# Patient Record
Sex: FEMALE | Race: OTHER | HISPANIC OR LATINO
[De-identification: names, ages, dates, MRNs, and addresses within clinical notes are randomized per-mention and may not be internally consistent; named-entity substitution may affect disease eponyms.]

---

## 2018-04-03 ENCOUNTER — RESULT REVIEW (OUTPATIENT)
Age: 53
End: 2018-04-03

## 2018-06-30 ENCOUNTER — RESULT REVIEW (OUTPATIENT)
Age: 53
End: 2018-06-30

## 2021-03-06 ENCOUNTER — RESULT REVIEW (OUTPATIENT)
Age: 56
End: 2021-03-06

## 2021-04-12 ENCOUNTER — OUTPATIENT (OUTPATIENT)
Dept: OUTPATIENT SERVICES | Facility: HOSPITAL | Age: 56
LOS: 1 days | Discharge: HOME | End: 2021-04-12
Payer: OTHER MISCELLANEOUS

## 2021-04-12 VITALS
SYSTOLIC BLOOD PRESSURE: 132 MMHG | HEIGHT: 63 IN | RESPIRATION RATE: 17 BRPM | TEMPERATURE: 98 F | WEIGHT: 194.67 LBS | HEART RATE: 79 BPM | DIASTOLIC BLOOD PRESSURE: 76 MMHG | OXYGEN SATURATION: 98 %

## 2021-04-12 DIAGNOSIS — M75.121 COMPLETE ROTATOR CUFF TEAR OR RUPTURE OF RIGHT SHOULDER, NOT SPECIFIED AS TRAUMATIC: ICD-10-CM

## 2021-04-12 DIAGNOSIS — Z98.890 OTHER SPECIFIED POSTPROCEDURAL STATES: Chronic | ICD-10-CM

## 2021-04-12 DIAGNOSIS — Z90.710 ACQUIRED ABSENCE OF BOTH CERVIX AND UTERUS: Chronic | ICD-10-CM

## 2021-04-12 DIAGNOSIS — Z01.818 ENCOUNTER FOR OTHER PREPROCEDURAL EXAMINATION: ICD-10-CM

## 2021-04-12 LAB
A1C WITH ESTIMATED AVERAGE GLUCOSE RESULT: 5.9 % — HIGH (ref 4–5.6)
ANION GAP SERPL CALC-SCNC: 6 MMOL/L — SIGNIFICANT CHANGE UP (ref 5–17)
APTT BLD: 31.4 SEC — SIGNIFICANT CHANGE UP (ref 27.5–35.5)
BLD GP AB SCN SERPL QL: SIGNIFICANT CHANGE UP
BUN SERPL-MCNC: 13 MG/DL — SIGNIFICANT CHANGE UP (ref 7–23)
CALCIUM SERPL-MCNC: 9.8 MG/DL — SIGNIFICANT CHANGE UP (ref 8.5–10.1)
CHLORIDE SERPL-SCNC: 104 MMOL/L — SIGNIFICANT CHANGE UP (ref 96–108)
CO2 SERPL-SCNC: 28 MMOL/L — SIGNIFICANT CHANGE UP (ref 22–31)
CREAT SERPL-MCNC: 0.56 MG/DL — SIGNIFICANT CHANGE UP (ref 0.5–1.3)
ESTIMATED AVERAGE GLUCOSE: 123 MG/DL — HIGH (ref 68–114)
GLUCOSE SERPL-MCNC: 90 MG/DL — SIGNIFICANT CHANGE UP (ref 70–99)
HCT VFR BLD CALC: 42.4 % — SIGNIFICANT CHANGE UP (ref 34.5–45)
HGB BLD-MCNC: 13.8 G/DL — SIGNIFICANT CHANGE UP (ref 11.5–15.5)
INR BLD: 1.04 RATIO — SIGNIFICANT CHANGE UP (ref 0.88–1.16)
MCHC RBC-ENTMCNC: 26.3 PG — LOW (ref 27–34)
MCHC RBC-ENTMCNC: 32.5 GM/DL — SIGNIFICANT CHANGE UP (ref 32–36)
MCV RBC AUTO: 80.8 FL — SIGNIFICANT CHANGE UP (ref 80–100)
MRSA PCR RESULT.: SIGNIFICANT CHANGE UP
NRBC # BLD: 0 /100 WBCS — SIGNIFICANT CHANGE UP (ref 0–0)
PLATELET # BLD AUTO: 298 K/UL — SIGNIFICANT CHANGE UP (ref 150–400)
POTASSIUM SERPL-MCNC: 4.2 MMOL/L — SIGNIFICANT CHANGE UP (ref 3.5–5.3)
POTASSIUM SERPL-SCNC: 4.2 MMOL/L — SIGNIFICANT CHANGE UP (ref 3.5–5.3)
PROTHROM AB SERPL-ACNC: 12 SEC — SIGNIFICANT CHANGE UP (ref 10.6–13.6)
RBC # BLD: 5.25 M/UL — HIGH (ref 3.8–5.2)
RBC # FLD: 14.6 % — HIGH (ref 10.3–14.5)
S AUREUS DNA NOSE QL NAA+PROBE: SIGNIFICANT CHANGE UP
SODIUM SERPL-SCNC: 138 MMOL/L — SIGNIFICANT CHANGE UP (ref 135–145)
WBC # BLD: 8.26 K/UL — SIGNIFICANT CHANGE UP (ref 3.8–10.5)
WBC # FLD AUTO: 8.26 K/UL — SIGNIFICANT CHANGE UP (ref 3.8–10.5)

## 2021-04-12 PROCEDURE — 93010 ELECTROCARDIOGRAM REPORT: CPT

## 2021-04-12 NOTE — H&P PST ADULT - NSICDXPROBLEM_GEN_ALL_CORE_FT
PROBLEM DIAGNOSES  Problem: Complete rotator cuff tear or rupture of right shoulder, not specified as traumatic  Assessment and Plan: Right reverse shoulder arthroplasty, biceps tenodesis  labs - cbc,pt/ptt,bmp,t&s,nose cx,ekg  M/C required  preop 3 day hibiclens instruction reviewed and given .instructed on if  nose cx positive use mupuricin 5 days and checklist given  take routine meds DOS with sips of water. avoid NSAID and OTC supplements. verbalized understanding  information on proper nutrition , increase protein and better food choices provided in packet

## 2021-04-12 NOTE — H&P PST ADULT - MS EXT PE MLT D E PC
Last OV 8/25/17  Last lab 8/24/17  Last refill levothyroxine, HCTZ, rabeprazole- Please verify rabeprazole 20 mg ?    Does not show that Dr. Bowie has ever filled these medications     Refill set up below       
Pt states she needs to have Rabeprazole refilled also.   [facility]   
no pedal edema

## 2021-04-12 NOTE — H&P PST ADULT - ASSESSMENT
55F no pmhx c/o right shoulder pain after trip and fall onto right shoulder while at work on 3- found to have complete rotator cuff tear or rupture of right shoulder here for PST for scheduled Right reverse shoulder arthroplasty biceps tenodesis  CAPRINI SCORE    AGE RELATED RISK FACTORS                                                       MOBILITY RELATED FACTORS  [x ] Age 41-60 years                                            (1 Point)                  [ ] Bed rest                                                        (1 Point)  [ ] Age: 61-74 years                                           (2 Points)                [ ] Plaster cast                                                   (2 Points)  [ ] Age= 75 years                                              (3 Points)                 [ ] Bed bound for more than 72 hours                   (2 Points)    DISEASE RELATED RISK FACTORS                                               GENDER SPECIFIC FACTORS  [ ] Edema in the lower extremities                       (1 Point)                  [ ] Pregnancy                                                     (1 Point)  [ ] Varicose veins                                               (1 Point)                  [ ] Post-partum < 6 weeks                                   (1 Point)             [ x] BMI > 25 Kg/m2                                            (1 Point)                  [ ] Hormonal therapy  or oral contraception            (1 Point)                 [ ] Sepsis (in the previous month)                        (1 Point)                  [ ] History of pregnancy complications  [ ] Pneumonia or serious lung disease                                               [ ] Unexplained or recurrent                       (1 Point)           (in the previous month)                               (1 Point)  [ ] Abnormal pulmonary function test                     (1 Point)                 SURGERY RELATED RISK FACTORS  [ ] Acute myocardial infarction                              (1 Point)                 [ ]  Section                                            (1 Point)  [ ] Congestive heart failure (in the previous month)  (1 Point)                 [ ] Minor surgery                                                 (1 Point)   [ ] Inflammatory bowel disease                             (1 Point)                 [ ] Arthroscopic surgery                                        (2 Points)  [ ] Central venous access                                    (2 Points)                [ ] General surgery lasting more than 45 minutes   (2 Points)       [ ] Stroke (in the previous month)                          (5 Points)               [x ] Elective arthroplasty                                        (5 Points)                                                                                                                                               HEMATOLOGY RELATED FACTORS                                                 TRAUMA RELATED RISK FACTORS  [ ] Prior episodes of VTE                                     (3 Points)                 [ ] Fracture of the hip, pelvis, or leg                       (5 Points)  [ ] Positive family history for VTE                         (3 Points)                 [ ] Acute spinal cord injury (in the previous month)  (5 Points)  [ ] Prothrombin 82384 A                                      (3 Points)                 [ ] Paralysis  (less than 1 month)                          (5 Points)  [ ] Factor V Leiden                                             (3 Points)                 [ ] Multiple Trauma within 1 month                         (5 Points)  [ ] Lupus anticoagulants                                     (3 Points)                                                           [ ] Anticardiolipin antibodies                                (3 Points)                                                       [ ] High homocysteine in the blood                      (3 Points)                                             [ ] Other congenital or acquired thrombophilia       (3 Points)                                                [ ] Heparin induced thrombocytopenia                  (3 Points)                                          Total Score [   7       ]

## 2021-04-12 NOTE — H&P PST ADULT - HISTORY OF PRESENT ILLNESS
55F no pmhx c/o right shoulder pain,,,,,, 55F no pmhx c/o right shoulder pain after trip and fall onto right shoulder while at work on 3- found to have complete rotator cuff tear or rupture of right shoulder here for PST for scheduled Right reverse shoulder arthroplasty biceps tenodesis  This patient denies any fever, cough, sob, flu like symptoms or travel outside of the [place] in the past 30 days

## 2021-04-16 DIAGNOSIS — Z01.818 ENCOUNTER FOR OTHER PREPROCEDURAL EXAMINATION: ICD-10-CM

## 2021-04-16 PROBLEM — Z00.00 ENCOUNTER FOR PREVENTIVE HEALTH EXAMINATION: Status: ACTIVE | Noted: 2021-04-16

## 2021-04-23 ENCOUNTER — APPOINTMENT (OUTPATIENT)
Dept: DISASTER EMERGENCY | Facility: CLINIC | Age: 56
End: 2021-04-23

## 2021-04-23 LAB — SARS-COV-2 N GENE NPH QL NAA+PROBE: NOT DETECTED

## 2021-04-25 ENCOUNTER — TRANSCRIPTION ENCOUNTER (OUTPATIENT)
Age: 56
End: 2021-04-25

## 2021-04-25 RX ORDER — BENZOCAINE AND MENTHOL 5; 1 G/100ML; G/100ML
1 LIQUID ORAL
Refills: 0 | Status: DISCONTINUED | OUTPATIENT
Start: 2021-04-26 | End: 2021-04-27

## 2021-04-25 RX ORDER — ASPIRIN/CALCIUM CARB/MAGNESIUM 324 MG
325 TABLET ORAL DAILY
Refills: 0 | Status: DISCONTINUED | OUTPATIENT
Start: 2021-04-27 | End: 2021-04-27

## 2021-04-25 RX ORDER — PANTOPRAZOLE SODIUM 20 MG/1
40 TABLET, DELAYED RELEASE ORAL
Refills: 0 | Status: DISCONTINUED | OUTPATIENT
Start: 2021-04-26 | End: 2021-04-27

## 2021-04-25 RX ORDER — OXYCODONE HYDROCHLORIDE 5 MG/1
10 TABLET ORAL EVERY 4 HOURS
Refills: 0 | Status: DISCONTINUED | OUTPATIENT
Start: 2021-04-26 | End: 2021-04-27

## 2021-04-25 RX ORDER — HYDROMORPHONE HYDROCHLORIDE 2 MG/ML
0.5 INJECTION INTRAMUSCULAR; INTRAVENOUS; SUBCUTANEOUS EVERY 4 HOURS
Refills: 0 | Status: DISCONTINUED | OUTPATIENT
Start: 2021-04-26 | End: 2021-04-27

## 2021-04-25 RX ORDER — SENNA PLUS 8.6 MG/1
2 TABLET ORAL AT BEDTIME
Refills: 0 | Status: DISCONTINUED | OUTPATIENT
Start: 2021-04-26 | End: 2021-04-27

## 2021-04-25 RX ORDER — KETOROLAC TROMETHAMINE 30 MG/ML
30 SYRINGE (ML) INJECTION EVERY 8 HOURS
Refills: 0 | Status: DISCONTINUED | OUTPATIENT
Start: 2021-04-26 | End: 2021-04-27

## 2021-04-25 RX ORDER — OXYCODONE HYDROCHLORIDE 5 MG/1
5 TABLET ORAL EVERY 4 HOURS
Refills: 0 | Status: DISCONTINUED | OUTPATIENT
Start: 2021-04-26 | End: 2021-04-27

## 2021-04-25 RX ORDER — SODIUM CHLORIDE 9 MG/ML
1000 INJECTION, SOLUTION INTRAVENOUS
Refills: 0 | Status: DISCONTINUED | OUTPATIENT
Start: 2021-04-26 | End: 2021-04-27

## 2021-04-25 RX ORDER — ONDANSETRON 8 MG/1
4 TABLET, FILM COATED ORAL EVERY 6 HOURS
Refills: 0 | Status: DISCONTINUED | OUTPATIENT
Start: 2021-04-26 | End: 2021-04-27

## 2021-04-25 RX ORDER — POLYETHYLENE GLYCOL 3350 17 G/17G
17 POWDER, FOR SOLUTION ORAL AT BEDTIME
Refills: 0 | Status: DISCONTINUED | OUTPATIENT
Start: 2021-04-26 | End: 2021-04-27

## 2021-04-25 RX ORDER — LANOLIN ALCOHOL/MO/W.PET/CERES
3 CREAM (GRAM) TOPICAL AT BEDTIME
Refills: 0 | Status: DISCONTINUED | OUTPATIENT
Start: 2021-04-26 | End: 2021-04-27

## 2021-04-25 RX ORDER — ASCORBIC ACID 60 MG
500 TABLET,CHEWABLE ORAL
Refills: 0 | Status: DISCONTINUED | OUTPATIENT
Start: 2021-04-26 | End: 2021-04-27

## 2021-04-25 RX ORDER — ACETAMINOPHEN 500 MG
975 TABLET ORAL EVERY 8 HOURS
Refills: 0 | Status: DISCONTINUED | OUTPATIENT
Start: 2021-04-26 | End: 2021-04-27

## 2021-04-26 ENCOUNTER — RESULT REVIEW (OUTPATIENT)
Age: 56
End: 2021-04-26

## 2021-04-26 ENCOUNTER — TRANSCRIPTION ENCOUNTER (OUTPATIENT)
Age: 56
End: 2021-04-26

## 2021-04-26 ENCOUNTER — INPATIENT (INPATIENT)
Facility: HOSPITAL | Age: 56
LOS: 0 days | Discharge: HOME | End: 2021-04-27
Attending: ORTHOPAEDIC SURGERY | Admitting: ORTHOPAEDIC SURGERY
Payer: OTHER MISCELLANEOUS

## 2021-04-26 VITALS
SYSTOLIC BLOOD PRESSURE: 168 MMHG | HEIGHT: 63 IN | WEIGHT: 195.11 LBS | DIASTOLIC BLOOD PRESSURE: 84 MMHG | TEMPERATURE: 98 F | OXYGEN SATURATION: 98 % | HEART RATE: 70 BPM | RESPIRATION RATE: 17 BRPM

## 2021-04-26 DIAGNOSIS — Z90.710 ACQUIRED ABSENCE OF BOTH CERVIX AND UTERUS: Chronic | ICD-10-CM

## 2021-04-26 DIAGNOSIS — Z98.890 OTHER SPECIFIED POSTPROCEDURAL STATES: Chronic | ICD-10-CM

## 2021-04-26 PROCEDURE — 88311 DECALCIFY TISSUE: CPT | Mod: 26

## 2021-04-26 PROCEDURE — 88309 TISSUE EXAM BY PATHOLOGIST: CPT | Mod: 26

## 2021-04-26 RX ORDER — GLUCOSAMINE/CHONDROITIN/C/MANG 500-400 MG
3 CAPSULE ORAL
Qty: 0 | Refills: 0 | DISCHARGE

## 2021-04-26 RX ORDER — SODIUM CHLORIDE 9 MG/ML
1000 INJECTION, SOLUTION INTRAVENOUS
Refills: 0 | Status: DISCONTINUED | OUTPATIENT
Start: 2021-04-26 | End: 2021-04-26

## 2021-04-26 RX ORDER — ONDANSETRON 8 MG/1
4 TABLET, FILM COATED ORAL ONCE
Refills: 0 | Status: DISCONTINUED | OUTPATIENT
Start: 2021-04-26 | End: 2021-04-26

## 2021-04-26 RX ORDER — ACETAMINOPHEN 500 MG
2 TABLET ORAL
Qty: 0 | Refills: 0 | DISCHARGE

## 2021-04-26 RX ORDER — SODIUM CHLORIDE 9 MG/ML
3 INJECTION INTRAMUSCULAR; INTRAVENOUS; SUBCUTANEOUS EVERY 8 HOURS
Refills: 0 | Status: DISCONTINUED | OUTPATIENT
Start: 2021-04-26 | End: 2021-04-26

## 2021-04-26 RX ORDER — CEFAZOLIN SODIUM 1 G
2000 VIAL (EA) INJECTION EVERY 8 HOURS
Refills: 0 | Status: COMPLETED | OUTPATIENT
Start: 2021-04-26 | End: 2021-04-27

## 2021-04-26 RX ORDER — HYDROMORPHONE HYDROCHLORIDE 2 MG/ML
1 INJECTION INTRAMUSCULAR; INTRAVENOUS; SUBCUTANEOUS
Refills: 0 | Status: DISCONTINUED | OUTPATIENT
Start: 2021-04-26 | End: 2021-04-26

## 2021-04-26 RX ORDER — HYDROMORPHONE HYDROCHLORIDE 2 MG/ML
0.5 INJECTION INTRAMUSCULAR; INTRAVENOUS; SUBCUTANEOUS
Refills: 0 | Status: DISCONTINUED | OUTPATIENT
Start: 2021-04-26 | End: 2021-04-26

## 2021-04-26 RX ADMIN — SODIUM CHLORIDE 120 MILLILITER(S): 9 INJECTION, SOLUTION INTRAVENOUS at 12:57

## 2021-04-26 RX ADMIN — Medication 3 MILLIGRAM(S): at 21:49

## 2021-04-26 RX ADMIN — POLYETHYLENE GLYCOL 3350 17 GRAM(S): 17 POWDER, FOR SOLUTION ORAL at 21:49

## 2021-04-26 RX ADMIN — Medication 100 MILLIGRAM(S): at 17:50

## 2021-04-26 RX ADMIN — Medication 975 MILLIGRAM(S): at 16:00

## 2021-04-26 RX ADMIN — Medication 30 MILLIGRAM(S): at 15:32

## 2021-04-26 RX ADMIN — Medication 30 MILLIGRAM(S): at 22:20

## 2021-04-26 RX ADMIN — SENNA PLUS 2 TABLET(S): 8.6 TABLET ORAL at 21:49

## 2021-04-26 RX ADMIN — Medication 975 MILLIGRAM(S): at 15:32

## 2021-04-26 RX ADMIN — Medication 30 MILLIGRAM(S): at 21:49

## 2021-04-26 RX ADMIN — Medication 975 MILLIGRAM(S): at 22:19

## 2021-04-26 RX ADMIN — Medication 500 MILLIGRAM(S): at 17:50

## 2021-04-26 RX ADMIN — SODIUM CHLORIDE 100 MILLILITER(S): 9 INJECTION, SOLUTION INTRAVENOUS at 17:51

## 2021-04-26 RX ADMIN — Medication 30 MILLIGRAM(S): at 16:00

## 2021-04-26 RX ADMIN — Medication 975 MILLIGRAM(S): at 21:49

## 2021-04-26 NOTE — PHYSICAL THERAPY INITIAL EVALUATION ADULT - PRECAUTIONS/LIMITATIONS, REHAB EVAL
non wt bearing RUE, no ROM R shoulder joints but ok on elbow, wrist and fingers; RUE to be placed on a sling

## 2021-04-26 NOTE — PHYSICAL THERAPY INITIAL EVALUATION ADULT - DIAGNOSIS, PT EVAL
RUE on a sling with precautions to observe, functional ability: bed mobility, transfers and ambulation generally with supervision to independent , RUE on a sling, does not need any walking device.

## 2021-04-26 NOTE — DISCHARGE NOTE PROVIDER - NSDCMRMEDTOKEN_GEN_ALL_CORE_FT
Glucosamine Chondroitin oral capsule: 3 cap(s) orally once a day  Turmeric 500 mg oral capsule: 1 cap(s) orally once a day  Tylenol 325 mg oral tablet: 2 tab(s) orally every 4 hours, As Needed   ascorbic acid 500 mg oral tablet: 1 tab(s) orally 2 times a day  aspirin 325 mg oral tablet: 1 tab(s) orally once a day MDD:1  Glucosamine Chondroitin oral capsule: 3 cap(s) orally once a day  Multiple Vitamins oral tablet: 1 tab(s) orally once a day  oxyCODONE 5 mg oral tablet: 1- 2 tab(s) orally every 4 hours, As Needed -Pain MDD:10  polyethylene glycol 3350 oral powder for reconstitution: 17 gram(s) orally once a day (at bedtime)  senna oral tablet: 2 tab(s) orally once a day (at bedtime)  Tylenol 325 mg oral tablet: 2 tab(s) orally every 4 hours, As Needed

## 2021-04-26 NOTE — CONSULT NOTE ADULT - SUBJECTIVE AND OBJECTIVE BOX
PACO BROWN is a 55y Female s/p RIGHT REVERSE  SHOULDER ARTHROPLASTY BICEPS STENODESIS    RIGHT REVERSE  SHOULDER ARTHROPLASTY, BICEPS TENODESIS      w/ h/o No pertinent past medical history      denies any chest pain shortness of breath palpitation dizziness lightheadedness nausea vomiting fever or chills    S/P arthroscopic surgery of left knee    S/P total hysterectomy        SH: doesnot smoke or drink at this time    No Known Allergies    acetaminophen   Tablet .. 975 milliGRAM(s) Oral every 8 hours  aluminum hydroxide/magnesium hydroxide/simethicone Suspension 30 milliLiter(s) Oral four times a day PRN  ascorbic acid 500 milliGRAM(s) Oral two times a day  benzocaine 15 mG/menthol 3.6 mG (Sugar-Free) Lozenge 1 Lozenge Oral every 2 hours PRN  ceFAZolin   IVPB 2000 milliGRAM(s) IV Intermittent every 8 hours  HYDROmorphone  Injectable 0.5 milliGRAM(s) IV Push every 4 hours PRN  ketorolac   Injectable 30 milliGRAM(s) IV Push every 8 hours  lactated ringers. 1000 milliLiter(s) IV Continuous <Continuous>  melatonin 3 milliGRAM(s) Oral at bedtime  multivitamin 1 Tablet(s) Oral daily  ondansetron Injectable 4 milliGRAM(s) IV Push every 6 hours PRN  oxyCODONE    IR 5 milliGRAM(s) Oral every 4 hours PRN  oxyCODONE    IR 10 milliGRAM(s) Oral every 4 hours PRN  pantoprazole    Tablet 40 milliGRAM(s) Oral before breakfast  polyethylene glycol 3350 17 Gram(s) Oral at bedtime  senna 2 Tablet(s) Oral at bedtime    T(C): 36.8 (04-26-21 @ 16:57), Max: 36.8 (04-26-21 @ 16:57)  HR: 79 (04-26-21 @ 16:57) (59 - 98)  BP: 126/71 (04-26-21 @ 16:57) (100/61 - 168/84)  RR: 17 (04-26-21 @ 16:57) (16 - 20)  SpO2: 97% (04-26-21 @ 16:57) (94% - 99%)  HEENT unremarkable  neck no JVD or bruit  heart normal S1 S2 RRR no gallops or rubs  chest clear to auscultation  abd sof nontender non distended +bs  ext no calf tenderness    A/P   DVT PX  pain control  bowel regimen   wound care as per ortho  GI PX  antiemetics prn  incentive spirometer

## 2021-04-26 NOTE — PHYSICAL THERAPY INITIAL EVALUATION ADULT - PERTINENT HX OF CURRENT PROBLEM, REHAB EVAL
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

## 2021-04-26 NOTE — DISCHARGE NOTE PROVIDER - NSDCCPCAREPLAN_GEN_ALL_CORE_FT
PRINCIPAL DISCHARGE DIAGNOSIS  Diagnosis: Rotator cuff tear arthropathy, right  Assessment and Plan of Treatment:

## 2021-04-26 NOTE — DISCHARGE NOTE PROVIDER - CARE PROVIDER_API CALL
Justino Shepherd  ORTHOPAEDIC SURGERY  [address] [place] [address]  Phone: [phone]  Fax: [phone]  Follow Up Time:

## 2021-04-26 NOTE — PHYSICAL THERAPY INITIAL EVALUATION ADULT - LIVES WITH, PROFILE
Pt states she lives in a pvt house with grand daughter and daughter, has 5 steps with 1 rails to enter the house, stays on main floor.

## 2021-04-26 NOTE — DISCHARGE NOTE PROVIDER - HOSPITAL COURSE
55yFemale with history of OA presenting for right reverse TSA by Dr. Shepherd on 4/26. Risk and benefits of surgery were explained to the patient. The patient understood and agreed to proceed with surgery. Patient underwent the procedure with no intraoperative complications. Pt was brought in stable condition to the PACU. Once stable in PACU, pt was brought to the floor. During hospital stay pt was followed by Medicine, physical therapy, Home Care during this admission. Pt had an uneventful hospital course. Pt is stable for discharge to home 55yFemale with history of OA presenting for right reverse TSA by Dr. Shepherd on 4/26. Risk and benefits of surgery were explained to the patient. The patient understood and agreed to proceed with surgery. Patient underwent the procedure with no intraoperative complications. Pt was brought in stable condition to the PACU. Once stable in PACU, pt was brought to the floor. During hospital stay pt was followed by Medicine, physical therapy, Home Care during this admission. Pt had an uneventful hospital course. Pt is stable for discharge to home on POD#1.

## 2021-04-26 NOTE — PHYSICAL THERAPY INITIAL EVALUATION ADULT - GENERAL OBSERVATIONS, REHAB EVAL
Pt. is found supine, alert, on room air, c/o no pain in the RUE but whole arm is numb, has sling, RUE properly positioned with pillow support.

## 2021-04-26 NOTE — PHYSICAL THERAPY INITIAL EVALUATION ADULT - RANGE OF MOTION EXAMINATION, REHAB EVAL
R shoulder joint ROM not tested per precaution of NO ROM; R elbow wrist and fingers WNL; LUE and both LE WNL

## 2021-04-26 NOTE — DISCHARGE NOTE PROVIDER - NSDCFUADDAPPT_GEN_ALL_CORE_FT
Follow up with your surgeon in two weeks. Call for appointment.  If you need more pain medication, call your surgeon's office. For medication refills or authorizations, please call [phone] xt [phone]  We recommend that you call and schedule a follow up appointment within 2-4 weeks with your primary care physician for repeat blood work (CBC and BMP) for post hospital discharge follow-up care.  Call your surgeon if you have increased redness/pain/drainage or fever. Return to ER for shortness of breath/calf tenderness.

## 2021-04-27 ENCOUNTER — TRANSCRIPTION ENCOUNTER (OUTPATIENT)
Age: 56
End: 2021-04-27

## 2021-04-27 VITALS
SYSTOLIC BLOOD PRESSURE: 122 MMHG | RESPIRATION RATE: 16 BRPM | HEART RATE: 73 BPM | TEMPERATURE: 98 F | DIASTOLIC BLOOD PRESSURE: 60 MMHG | OXYGEN SATURATION: 96 %

## 2021-04-27 LAB
ANION GAP SERPL CALC-SCNC: 7 MMOL/L — SIGNIFICANT CHANGE UP (ref 5–17)
BUN SERPL-MCNC: 17 MG/DL — SIGNIFICANT CHANGE UP (ref 7–23)
CALCIUM SERPL-MCNC: 8.2 MG/DL — LOW (ref 8.5–10.1)
CHLORIDE SERPL-SCNC: 99 MMOL/L — SIGNIFICANT CHANGE UP (ref 96–108)
CO2 SERPL-SCNC: 25 MMOL/L — SIGNIFICANT CHANGE UP (ref 22–31)
CREAT SERPL-MCNC: 0.66 MG/DL — SIGNIFICANT CHANGE UP (ref 0.5–1.3)
GLUCOSE SERPL-MCNC: 135 MG/DL — HIGH (ref 70–99)
HCT VFR BLD CALC: 31.8 % — LOW (ref 34.5–45)
HGB BLD-MCNC: 10.6 G/DL — LOW (ref 11.5–15.5)
MCHC RBC-ENTMCNC: 26.4 PG — LOW (ref 27–34)
MCHC RBC-ENTMCNC: 33.3 GM/DL — SIGNIFICANT CHANGE UP (ref 32–36)
MCV RBC AUTO: 79.1 FL — LOW (ref 80–100)
NRBC # BLD: 0 /100 WBCS — SIGNIFICANT CHANGE UP (ref 0–0)
PLATELET # BLD AUTO: 195 K/UL — SIGNIFICANT CHANGE UP (ref 150–400)
POTASSIUM SERPL-MCNC: 3.9 MMOL/L — SIGNIFICANT CHANGE UP (ref 3.5–5.3)
POTASSIUM SERPL-SCNC: 3.9 MMOL/L — SIGNIFICANT CHANGE UP (ref 3.5–5.3)
RBC # BLD: 4.02 M/UL — SIGNIFICANT CHANGE UP (ref 3.8–5.2)
RBC # FLD: 14.4 % — SIGNIFICANT CHANGE UP (ref 10.3–14.5)
SODIUM SERPL-SCNC: 131 MMOL/L — LOW (ref 135–145)
WBC # BLD: 12.09 K/UL — HIGH (ref 3.8–10.5)
WBC # FLD AUTO: 12.09 K/UL — HIGH (ref 3.8–10.5)

## 2021-04-27 PROCEDURE — 73030 X-RAY EXAM OF SHOULDER: CPT | Mod: 26,RT

## 2021-04-27 RX ORDER — ASCORBIC ACID 60 MG
1 TABLET,CHEWABLE ORAL
Qty: 0 | Refills: 0 | DISCHARGE
Start: 2021-04-27

## 2021-04-27 RX ORDER — OXYCODONE HYDROCHLORIDE 5 MG/1
2 TABLET ORAL
Qty: 50 | Refills: 0
Start: 2021-04-27 | End: 2021-05-01

## 2021-04-27 RX ORDER — SENNA PLUS 8.6 MG/1
2 TABLET ORAL
Qty: 0 | Refills: 0 | DISCHARGE
Start: 2021-04-27

## 2021-04-27 RX ORDER — MILK THISTLE 150 MG
1 CAPSULE ORAL
Qty: 0 | Refills: 0 | DISCHARGE

## 2021-04-27 RX ORDER — ASPIRIN/CALCIUM CARB/MAGNESIUM 324 MG
1 TABLET ORAL
Qty: 14 | Refills: 0
Start: 2021-04-27 | End: 2021-05-10

## 2021-04-27 RX ORDER — POLYETHYLENE GLYCOL 3350 17 G/17G
17 POWDER, FOR SOLUTION ORAL
Qty: 0 | Refills: 0 | DISCHARGE
Start: 2021-04-27

## 2021-04-27 RX ADMIN — Medication 1 TABLET(S): at 11:37

## 2021-04-27 RX ADMIN — Medication 30 MILLIGRAM(S): at 05:24

## 2021-04-27 RX ADMIN — Medication 100 MILLIGRAM(S): at 03:18

## 2021-04-27 RX ADMIN — Medication 30 MILLIGRAM(S): at 06:32

## 2021-04-27 RX ADMIN — Medication 325 MILLIGRAM(S): at 11:37

## 2021-04-27 RX ADMIN — PANTOPRAZOLE SODIUM 40 MILLIGRAM(S): 20 TABLET, DELAYED RELEASE ORAL at 07:41

## 2021-04-27 RX ADMIN — OXYCODONE HYDROCHLORIDE 10 MILLIGRAM(S): 5 TABLET ORAL at 11:37

## 2021-04-27 RX ADMIN — Medication 975 MILLIGRAM(S): at 06:32

## 2021-04-27 RX ADMIN — Medication 500 MILLIGRAM(S): at 05:24

## 2021-04-27 RX ADMIN — Medication 975 MILLIGRAM(S): at 05:24

## 2021-04-27 RX ADMIN — OXYCODONE HYDROCHLORIDE 10 MILLIGRAM(S): 5 TABLET ORAL at 12:28

## 2021-04-27 NOTE — OCCUPATIONAL THERAPY INITIAL EVALUATION ADULT - RANGE OF MOTION EXAMINATION, UPPER EXTREMITY
RUE shoulder ROM DNT due to precautions; RUE AROM distally to shoulder WFL./Left UE Active ROM was WFL (within functional limits)

## 2021-04-27 NOTE — OCCUPATIONAL THERAPY INITIAL EVALUATION ADULT - ADDITIONAL COMMENTS
Pt lives with son and granddaughter (Son can assist in the morning and after work. Granddaughter goes to school) in a private house with 4-5 steps to enter with bilateral handrails (Close together). Once inside, the pt bedroom and bathroom is on that floor when entering. The pt bathroom has a tub/shower combination, regular toilet and no DME. The pt ambulates with no device and owns a cane at home. The pt is R handed.

## 2021-04-27 NOTE — OCCUPATIONAL THERAPY INITIAL EVALUATION ADULT - STRENGTHENING, PT EVAL
Pt will increase right UE strength to 5/5 to improve functional strength needed to engage in functional tasks by 4 weeks

## 2021-04-27 NOTE — DISCHARGE NOTE NURSING/CASE MANAGEMENT/SOCIAL WORK - PATIENT PORTAL LINK FT
You can access the FollowMyHealth Patient Portal offered by [facility] by registering at the following website: [email or web address]. By joining [employer]’s FollowMyHealth portal, you will also be able to view your health information using other applications (apps) compatible with our system.

## 2021-04-27 NOTE — PROGRESS NOTE ADULT - SUBJECTIVE AND OBJECTIVE BOX
Ortho Post Op Check    Name: PACO BROWN  MR #: [id number]    Procedure: right reverse TSA  Surgeon: Joao    Pt comfortable without complaints, pain controlled. Endorsing numbness to right hand.  Denies CP, SOB, N/V.    General Exam:  Vital Signs Last 24 Hrs  T(C): 36.6 (04-26-21 @ 13:20), Max: 36.7 (04-26-21 @ 08:05)  T(F): 97.8 (04-26-21 @ 13:20), Max: 98 (04-26-21 @ 08:05)  HR: 65 (04-26-21 @ 13:20) (59 - 70)  BP: 111/57 (04-26-21 @ 13:20) (100/61 - 168/84)  BP(mean): --  RR: 16 (04-26-21 @ 13:20) (16 - 20)  SpO2: 99% (04-26-21 @ 13:20) (95% - 99%)    General: Pt Alert and oriented, NAD, sleepy  Prineo dressing C/D/I. No bleeding.  Pulses: 2+ radial pulse. Cap refill < 2 sec.  Sensation: Grossly intact to light touch with slightly decreased sensation s/p block  Motor: +minimal ROM digits secondary to numbness as per patient        A/P: 55yFemale POD#0 s/p right reverse total shoulder arthroplasty    - Pain Control  - wound care  - DVT ppx:  QD  - Post op abx: as per SCIP  - F/U AM labs  - PT eval pending  - Weight bearing status: NWB RUE, No ROM of shoulder, okay to ROM elbow, wrist and digits
55yFemale s/p right reverse TSA POD#1. Pt seen and examined in NAD. Pain controlled. Pt denies any new complaints. Pt denies CP/SOB/N/V/D/numbness/tingling/bowel or bladder dysfunction.     PE:   Neuro: AAOX3  RUE: Prineo dressing C/D/I. Sling in place. +ROM elbow/wrist/fingers. +ok/thumbsup/fingercross signs.  strength: 5/5.  RP2+ NVI.   LUE: Skin intact. +ROM shoulder/elbow/wrist/fingers. +ok/thumbsup/fingercross signs.  strength: 5/5.  RP2+ NVI.   B/L LE: Skin intact. +ROM hip/knee/ankle/toes. Ankle Dorsi/plantarflexion: 5/5. Calf: soft, compressible and nontender. DP/PT 2+ NVI.                             10.6   12.09 )-----------( 195      ( 27 Apr 2021 07:46 )             31.8       04-27    131<L>  |  99  |  17  ----------------------------<  135<H>  3.9   |  25  |  0.66    Ca    8.2<L>      27 Apr 2021 07:46          A/P: 55yFemale s/p right reverse TSA POD#1.  Post op Xray reviewed: good alignment.   Pain controlled  PT/OT: NWB RUE. No ROM to shoulder  DVT ppx: SCDs ASA 325mg daily X 14 days  Wound care, Isometric exercises, incentive spirometry   Medical consult appreciated  Discharge: planning home today  All the above discussed and understood by pt   D/W Dr Shepherd

## 2021-04-27 NOTE — DISCHARGE NOTE NURSING/CASE MANAGEMENT/SOCIAL WORK - NSDCFUADDAPPT_GEN_ALL_CORE_FT
Follow up with your surgeon in two weeks. Call for appointment.  If you need more pain medication, call your surgeon's office. For medication refills or authorizations, please call [phone] xt [phone]  We recommend that you call and schedule a follow up appointment within 2-4 weeks with your primary care physician for repeat blood work (CBC and BMP) for post hospital discharge follow-up care.  Call your surgeon if you have increased redness/pain/drainage or fever. Return to ER for shortness of breath/calf tenderness.

## 2021-04-27 NOTE — OCCUPATIONAL THERAPY INITIAL EVALUATION ADULT - GENERAL OBSERVATIONS, REHAB EVAL
Pt was encountered semi-supine in bed; NAD, S/P R reverse TSA POD 1, shoulder precautions (No shoulder ROM; elbow, wrist and hand RUE shoulder dressing clean, dry and intact, RUE in sling, alert, verbalized name and , cooperative, followed commands; pt had no c/o pain at time of encounter.

## 2021-04-27 NOTE — OCCUPATIONAL THERAPY INITIAL EVALUATION ADULT - TRANSFER TRAINING, PT EVAL
Patient will be able to perform functional transfers, using least restrictive device, independently within 2 weeks.

## 2021-04-27 NOTE — OCCUPATIONAL THERAPY INITIAL EVALUATION ADULT - RUE MMT, REHAB EVAL
DNT shoulder strength due to precautions; Grossly equal to or greater then 3/5 distally to shoulder.

## 2021-04-28 ENCOUNTER — EMERGENCY (EMERGENCY)
Facility: HOSPITAL | Age: 56
LOS: 0 days | Discharge: HOME | End: 2021-04-28
Attending: EMERGENCY MEDICINE
Payer: OTHER MISCELLANEOUS

## 2021-04-28 VITALS
HEIGHT: 63 IN | TEMPERATURE: 98 F | WEIGHT: 195.11 LBS | HEART RATE: 106 BPM | OXYGEN SATURATION: 95 % | SYSTOLIC BLOOD PRESSURE: 117 MMHG | RESPIRATION RATE: 20 BRPM | DIASTOLIC BLOOD PRESSURE: 75 MMHG

## 2021-04-28 DIAGNOSIS — N39.0 URINARY TRACT INFECTION, SITE NOT SPECIFIED: ICD-10-CM

## 2021-04-28 DIAGNOSIS — Z98.890 OTHER SPECIFIED POSTPROCEDURAL STATES: Chronic | ICD-10-CM

## 2021-04-28 DIAGNOSIS — M19.011 PRIMARY OSTEOARTHRITIS, RIGHT SHOULDER: Chronic | ICD-10-CM

## 2021-04-28 DIAGNOSIS — K59.00 CONSTIPATION, UNSPECIFIED: ICD-10-CM

## 2021-04-28 DIAGNOSIS — Z90.710 ACQUIRED ABSENCE OF BOTH CERVIX AND UTERUS: Chronic | ICD-10-CM

## 2021-04-28 DIAGNOSIS — R10.9 UNSPECIFIED ABDOMINAL PAIN: ICD-10-CM

## 2021-04-28 PROBLEM — Z78.9 OTHER SPECIFIED HEALTH STATUS: Chronic | Status: ACTIVE | Noted: 2021-04-12

## 2021-04-28 LAB
ALBUMIN SERPL ELPH-MCNC: 3.6 G/DL — SIGNIFICANT CHANGE UP (ref 3.3–5)
ALP SERPL-CCNC: 104 U/L — SIGNIFICANT CHANGE UP (ref 40–120)
ALT FLD-CCNC: 29 U/L — SIGNIFICANT CHANGE UP (ref 12–78)
ANION GAP SERPL CALC-SCNC: 9 MMOL/L — SIGNIFICANT CHANGE UP (ref 5–17)
APPEARANCE UR: CLEAR — SIGNIFICANT CHANGE UP
AST SERPL-CCNC: 29 U/L — SIGNIFICANT CHANGE UP (ref 15–37)
BACTERIA # UR AUTO: ABNORMAL
BASOPHILS # BLD AUTO: 0.04 K/UL — SIGNIFICANT CHANGE UP (ref 0–0.2)
BASOPHILS NFR BLD AUTO: 0.3 % — SIGNIFICANT CHANGE UP (ref 0–2)
BILIRUB SERPL-MCNC: 0.3 MG/DL — SIGNIFICANT CHANGE UP (ref 0.2–1.2)
BILIRUB UR-MCNC: NEGATIVE — SIGNIFICANT CHANGE UP
BUN SERPL-MCNC: 12 MG/DL — SIGNIFICANT CHANGE UP (ref 7–23)
CALCIUM SERPL-MCNC: 8.9 MG/DL — SIGNIFICANT CHANGE UP (ref 8.5–10.1)
CHLORIDE SERPL-SCNC: 98 MMOL/L — SIGNIFICANT CHANGE UP (ref 96–108)
CO2 SERPL-SCNC: 24 MMOL/L — SIGNIFICANT CHANGE UP (ref 22–31)
COLOR SPEC: YELLOW — SIGNIFICANT CHANGE UP
CREAT SERPL-MCNC: 0.55 MG/DL — SIGNIFICANT CHANGE UP (ref 0.5–1.3)
DIFF PNL FLD: NEGATIVE — SIGNIFICANT CHANGE UP
EOSINOPHIL # BLD AUTO: 0.09 K/UL — SIGNIFICANT CHANGE UP (ref 0–0.5)
EOSINOPHIL NFR BLD AUTO: 0.7 % — SIGNIFICANT CHANGE UP (ref 0–6)
EPI CELLS # UR: SIGNIFICANT CHANGE UP
GLUCOSE SERPL-MCNC: 131 MG/DL — HIGH (ref 70–99)
GLUCOSE UR QL: NEGATIVE MG/DL — SIGNIFICANT CHANGE UP
HCG SERPL-ACNC: <1 MIU/ML — SIGNIFICANT CHANGE UP
HCT VFR BLD CALC: 40.2 % — SIGNIFICANT CHANGE UP (ref 34.5–45)
HGB BLD-MCNC: 13.3 G/DL — SIGNIFICANT CHANGE UP (ref 11.5–15.5)
IMM GRANULOCYTES NFR BLD AUTO: 0.5 % — SIGNIFICANT CHANGE UP (ref 0–1.5)
KETONES UR-MCNC: ABNORMAL
LACTATE SERPL-SCNC: 1.2 MMOL/L — SIGNIFICANT CHANGE UP (ref 0.7–2)
LEUKOCYTE ESTERASE UR-ACNC: ABNORMAL
LIDOCAIN IGE QN: 106 U/L — SIGNIFICANT CHANGE UP (ref 73–393)
LYMPHOCYTES # BLD AUTO: 1.42 K/UL — SIGNIFICANT CHANGE UP (ref 1–3.3)
LYMPHOCYTES # BLD AUTO: 11.1 % — LOW (ref 13–44)
MCHC RBC-ENTMCNC: 26.4 PG — LOW (ref 27–34)
MCHC RBC-ENTMCNC: 33.1 GM/DL — SIGNIFICANT CHANGE UP (ref 32–36)
MCV RBC AUTO: 79.9 FL — LOW (ref 80–100)
MONOCYTES # BLD AUTO: 1.16 K/UL — HIGH (ref 0–0.9)
MONOCYTES NFR BLD AUTO: 9.1 % — SIGNIFICANT CHANGE UP (ref 2–14)
NEUTROPHILS # BLD AUTO: 9.98 K/UL — HIGH (ref 1.8–7.4)
NEUTROPHILS NFR BLD AUTO: 78.3 % — HIGH (ref 43–77)
NITRITE UR-MCNC: NEGATIVE — SIGNIFICANT CHANGE UP
NRBC # BLD: 0 /100 WBCS — SIGNIFICANT CHANGE UP (ref 0–0)
PH UR: 6 — SIGNIFICANT CHANGE UP (ref 5–8)
PLATELET # BLD AUTO: 238 K/UL — SIGNIFICANT CHANGE UP (ref 150–400)
POTASSIUM SERPL-MCNC: 3.3 MMOL/L — LOW (ref 3.5–5.3)
POTASSIUM SERPL-SCNC: 3.3 MMOL/L — LOW (ref 3.5–5.3)
PROT SERPL-MCNC: 7.8 GM/DL — SIGNIFICANT CHANGE UP (ref 6–8.3)
PROT UR-MCNC: NEGATIVE MG/DL — SIGNIFICANT CHANGE UP
RBC # BLD: 5.03 M/UL — SIGNIFICANT CHANGE UP (ref 3.8–5.2)
RBC # FLD: 15 % — HIGH (ref 10.3–14.5)
SODIUM SERPL-SCNC: 131 MMOL/L — LOW (ref 135–145)
SP GR SPEC: 1.01 — SIGNIFICANT CHANGE UP (ref 1.01–1.02)
UROBILINOGEN FLD QL: NEGATIVE MG/DL — SIGNIFICANT CHANGE UP
WBC # BLD: 12.76 K/UL — HIGH (ref 3.8–10.5)
WBC # FLD AUTO: 12.76 K/UL — HIGH (ref 3.8–10.5)
WBC UR QL: SIGNIFICANT CHANGE UP

## 2021-04-28 PROCEDURE — 99285 EMERGENCY DEPT VISIT HI MDM: CPT

## 2021-04-28 PROCEDURE — 74177 CT ABD & PELVIS W/CONTRAST: CPT | Mod: 26,MA

## 2021-04-28 RX ORDER — POLYETHYLENE GLYCOL 3350 17 G/17G
17 POWDER, FOR SOLUTION ORAL
Qty: 119 | Refills: 0
Start: 2021-04-28 | End: 2021-05-04

## 2021-04-28 RX ORDER — NITROFURANTOIN MACROCRYSTAL 50 MG
100 CAPSULE ORAL ONCE
Refills: 0 | Status: COMPLETED | OUTPATIENT
Start: 2021-04-28 | End: 2021-04-28

## 2021-04-28 RX ORDER — NITROFURANTOIN MACROCRYSTAL 50 MG
1 CAPSULE ORAL
Qty: 14 | Refills: 0
Start: 2021-04-28 | End: 2021-05-04

## 2021-04-28 RX ORDER — MULTIVIT WITH MIN/MFOLATE/K2 340-15/3 G
300 POWDER (GRAM) ORAL
Qty: 300 | Refills: 0
Start: 2021-04-28 | End: 2021-04-28

## 2021-04-28 RX ADMIN — Medication 100 MILLIGRAM(S): at 17:13

## 2021-04-28 NOTE — ED ADULT NURSE NOTE - PSH
S/P arthroscopic surgery of left knee    S/P total hysterectomy     Arthropathy of right shoulder    S/P arthroscopic surgery of left knee    S/P total hysterectomy

## 2021-04-28 NOTE — ED PROVIDER NOTE - CLINICAL SUMMARY MEDICAL DECISION MAKING FREE TEXT BOX
constipation noted and UTI - otherwise will dc with fleet enema and mag citrate and miralax for constipation and dc with macrobid for UTI.

## 2021-04-28 NOTE — ED PROVIDER NOTE - PATIENT PORTAL LINK FT
You can access the FollowMyHealth Patient Portal offered by [facility] by registering at the following website: [email or web address]. By joining [employer]’s FollowMyHealth portal, you will also be able to view your health information using other applications (apps) compatible with our system.

## 2021-04-28 NOTE — ED PROVIDER NOTE - OBJECTIVE STATEMENT
55 year old female w/no pertinent PMH presents to the ED for abdominal pain and inability to pass BM. Pt just left the hospital s/p right shoulder surgery x2 days ago. Also reports mild dizziness and diaphoresis. Denies fever/chills, cough, SOB, CP or N/V/D. Pt states she has tried enema with no relief of pain, however in seated position. Hx hysterectomy.

## 2021-04-30 DIAGNOSIS — M75.121 COMPLETE ROTATOR CUFF TEAR OR RUPTURE OF RIGHT SHOULDER, NOT SPECIFIED AS TRAUMATIC: ICD-10-CM

## 2021-04-30 DIAGNOSIS — M19.011 PRIMARY OSTEOARTHRITIS, RIGHT SHOULDER: ICD-10-CM

## 2021-04-30 DIAGNOSIS — E66.9 OBESITY, UNSPECIFIED: ICD-10-CM

## 2021-04-30 DIAGNOSIS — Z79.1 LONG TERM (CURRENT) USE OF NON-STEROIDAL ANTI-INFLAMMATORIES (NSAID): ICD-10-CM

## 2021-04-30 DIAGNOSIS — I10 ESSENTIAL (PRIMARY) HYPERTENSION: ICD-10-CM

## 2021-04-30 DIAGNOSIS — R73.03 PREDIABETES: ICD-10-CM

## 2021-04-30 DIAGNOSIS — F41.9 ANXIETY DISORDER, UNSPECIFIED: ICD-10-CM

## 2021-04-30 LAB
CULTURE RESULTS: NO GROWTH — SIGNIFICANT CHANGE UP
SPECIMEN SOURCE: SIGNIFICANT CHANGE UP

## 2021-07-30 ENCOUNTER — APPOINTMENT (OUTPATIENT)
Dept: RHEUMATOLOGY | Facility: CLINIC | Age: 56
End: 2021-07-30
Payer: COMMERCIAL

## 2021-07-30 ENCOUNTER — LABORATORY RESULT (OUTPATIENT)
Age: 56
End: 2021-07-30

## 2021-07-30 VITALS
TEMPERATURE: 98.1 F | RESPIRATION RATE: 17 BRPM | WEIGHT: 195 LBS | SYSTOLIC BLOOD PRESSURE: 134 MMHG | OXYGEN SATURATION: 96 % | DIASTOLIC BLOOD PRESSURE: 78 MMHG | HEIGHT: 62 IN | BODY MASS INDEX: 35.88 KG/M2 | HEART RATE: 99 BPM

## 2021-07-30 DIAGNOSIS — Z78.9 OTHER SPECIFIED HEALTH STATUS: ICD-10-CM

## 2021-07-30 DIAGNOSIS — Z63.5 DISRUPTION OF FAMILY BY SEPARATION AND DIVORCE: ICD-10-CM

## 2021-07-30 DIAGNOSIS — Z78.0 ASYMPTOMATIC MENOPAUSAL STATE: ICD-10-CM

## 2021-07-30 PROCEDURE — 36415 COLL VENOUS BLD VENIPUNCTURE: CPT

## 2021-07-30 PROCEDURE — 99245 OFF/OP CONSLTJ NEW/EST HI 55: CPT | Mod: 25

## 2021-07-30 SDOH — SOCIAL STABILITY - SOCIAL INSECURITY: DISRUPTION OF FAMILY BY SEPARATION AND DIVORCE: Z63.5

## 2021-08-02 LAB
ALBUMIN SERPL ELPH-MCNC: 4.8 G/DL
ALP BLD-CCNC: 113 U/L
ALT SERPL-CCNC: 47 U/L
ANION GAP SERPL CALC-SCNC: 15 MMOL/L
APPEARANCE: ABNORMAL
AST SERPL-CCNC: 36 U/L
B BURGDOR IGG+IGM SER QL IB: NORMAL
BACTERIA: ABNORMAL
BASOPHILS # BLD AUTO: 0.04 K/UL
BASOPHILS NFR BLD AUTO: 0.5 %
BILIRUB SERPL-MCNC: <0.2 MG/DL
BILIRUBIN URINE: NEGATIVE
BLOOD URINE: NEGATIVE
BUN SERPL-MCNC: 13 MG/DL
CALCIUM OXALATE CRYSTALS: ABNORMAL
CALCIUM SERPL-MCNC: 9.8 MG/DL
CHLORIDE SERPL-SCNC: 103 MMOL/L
CK SERPL-CCNC: 141 U/L
CO2 SERPL-SCNC: 22 MMOL/L
COLOR: YELLOW
CREAT SERPL-MCNC: 0.52 MG/DL
CRP SERPL-MCNC: 11 MG/L
DEPRECATED KAPPA LC FREE/LAMBDA SER: 0.52 RATIO
ENA SS-A AB SER IA-ACNC: <0.2 AL
ENA SS-B AB SER IA-ACNC: <0.2 AL
EOSINOPHIL # BLD AUTO: 0.18 K/UL
EOSINOPHIL NFR BLD AUTO: 2.3 %
ERYTHROCYTE [SEDIMENTATION RATE] IN BLOOD BY WESTERGREN METHOD: 37 MM/HR
GLUCOSE QUALITATIVE U: NEGATIVE
GLUCOSE SERPL-MCNC: 103 MG/DL
HCT VFR BLD CALC: 42.2 %
HGB BLD-MCNC: 13.6 G/DL
HYALINE CASTS: 5 /LPF
IGA SER QL IEP: 231 MG/DL
IGG SER QL IEP: 955 MG/DL
IGM SER QL IEP: 88 MG/DL
IMM GRANULOCYTES NFR BLD AUTO: 0.1 %
KAPPA LC CSF-MCNC: 2.75 MG/DL
KAPPA LC SERPL-MCNC: 1.42 MG/DL
KETONES URINE: NEGATIVE
LDH SERPL-CCNC: 203 U/L
LEUKOCYTE ESTERASE URINE: ABNORMAL
LYMPHOCYTES # BLD AUTO: 1.74 K/UL
LYMPHOCYTES NFR BLD AUTO: 21.8 %
MAGNESIUM SERPL-MCNC: 2.2 MG/DL
MAN DIFF?: NORMAL
MCHC RBC-ENTMCNC: 26.1 PG
MCHC RBC-ENTMCNC: 32.2 GM/DL
MCV RBC AUTO: 81 FL
MICROSCOPIC-UA: NORMAL
MONOCYTES # BLD AUTO: 0.79 K/UL
MONOCYTES NFR BLD AUTO: 9.9 %
NEUTROPHILS # BLD AUTO: 5.22 K/UL
NEUTROPHILS NFR BLD AUTO: 65.4 %
NITRITE URINE: NEGATIVE
PH URINE: 6
PHOSPHATE SERPL-MCNC: 3.8 MG/DL
PLATELET # BLD AUTO: 305 K/UL
POTASSIUM SERPL-SCNC: 4.5 MMOL/L
PROT SERPL-MCNC: 7.7 G/DL
PROTEIN URINE: NORMAL
RBC # BLD: 5.21 M/UL
RBC # FLD: 16 %
RED BLOOD CELLS URINE: 1 /HPF
RHEUMATOID FACT SER QL: 13 IU/ML
SODIUM SERPL-SCNC: 140 MMOL/L
SPECIFIC GRAVITY URINE: 1.02
SQUAMOUS EPITHELIAL CELLS: >27 /HPF
T3 SERPL-MCNC: 143 NG/DL
T3RU NFR SERPL: 1.1 TBI
T4 SERPL-MCNC: 7.6 UG/DL
THYROGLOB AB SERPL-ACNC: <20 IU/ML
THYROPEROXIDASE AB SERPL IA-ACNC: <10 IU/ML
TSH SERPL-ACNC: 1.53 UIU/ML
URATE SERPL-MCNC: 5.3 MG/DL
UROBILINOGEN URINE: NORMAL
WBC # FLD AUTO: 7.98 K/UL
WHITE BLOOD CELLS URINE: 27 /HPF

## 2021-08-03 PROBLEM — Z78.0 HISTORY OF MENOPAUSE: Status: RESOLVED | Noted: 2021-08-03 | Resolved: 2021-08-03

## 2021-08-03 PROBLEM — Z78.9 NON-SMOKER: Status: ACTIVE | Noted: 2021-07-30

## 2021-08-03 PROBLEM — Z63.5 DIVORCED: Status: ACTIVE | Noted: 2021-07-30

## 2021-08-03 RX ORDER — TURMERIC ROOT EXTRACT 500 MG
TABLET ORAL
Refills: 0 | Status: ACTIVE | COMMUNITY

## 2021-08-03 RX ORDER — ACETAMINOPHEN 500 MG
500 TABLET ORAL
Refills: 0 | Status: ACTIVE | COMMUNITY

## 2021-08-03 RX ORDER — LEVOCETIRIZINE DIHYDROCHLORIDE 5 MG/1
5 TABLET ORAL
Qty: 90 | Refills: 0 | Status: DISCONTINUED | COMMUNITY
Start: 2021-05-19

## 2021-08-03 RX ORDER — TRIAMCINOLONE ACETONIDE 1 MG/G
0.1 CREAM TOPICAL
Qty: 454 | Refills: 0 | Status: DISCONTINUED | COMMUNITY
Start: 2021-04-15

## 2021-08-03 RX ORDER — PROMETHAZINE HYDROCHLORIDE AND DEXTROMETHORPHAN HYDROBROMIDE ORAL SOLUTION 15; 6.25 MG/5ML; MG/5ML
6.25-15 SOLUTION ORAL
Qty: 240 | Refills: 0 | Status: DISCONTINUED | COMMUNITY
Start: 2021-05-19

## 2021-08-03 RX ORDER — LEVOFLOXACIN 500 MG/1
500 TABLET, FILM COATED ORAL
Qty: 10 | Refills: 0 | Status: DISCONTINUED | COMMUNITY
Start: 2021-05-19

## 2021-08-03 RX ORDER — NITROFURANTOIN (MONOHYDRATE/MACROCRYSTALS) 25; 75 MG/1; MG/1
100 CAPSULE ORAL
Qty: 14 | Refills: 0 | Status: DISCONTINUED | COMMUNITY
Start: 2021-04-28

## 2021-08-03 RX ORDER — ACETAMINOPHEN 500 MG/1
500 TABLET, COATED ORAL
Qty: 100 | Refills: 0 | Status: ACTIVE | COMMUNITY
Start: 2021-08-03

## 2021-08-03 RX ORDER — PREDNISONE 20 MG/1
20 TABLET ORAL
Qty: 15 | Refills: 0 | Status: DISCONTINUED | COMMUNITY
Start: 2021-05-19

## 2021-08-03 RX ORDER — ALBUTEROL SULFATE 90 UG/1
108 (90 BASE) INHALANT RESPIRATORY (INHALATION)
Qty: 8 | Refills: 0 | Status: DISCONTINUED | COMMUNITY
Start: 2021-05-19

## 2021-08-03 RX ORDER — OXYCODONE 5 MG/1
5 TABLET ORAL
Qty: 50 | Refills: 0 | Status: DISCONTINUED | COMMUNITY
Start: 2021-04-27

## 2021-08-03 RX ORDER — FLUTICASONE PROPIONATE 50 UG/1
50 SPRAY, METERED NASAL
Qty: 16 | Refills: 0 | Status: DISCONTINUED | COMMUNITY
Start: 2021-05-19

## 2021-08-03 RX ORDER — NAPROXEN 500 MG/1
500 TABLET ORAL
Qty: 60 | Refills: 0 | Status: DISCONTINUED | COMMUNITY
Start: 2021-05-19

## 2021-08-06 LAB
ANA PAT FLD IF-IMP: ABNORMAL
ANA SER IF-ACNC: ABNORMAL
CCP AB SER IA-ACNC: <8 UNITS
DSDNA AB SER-ACNC: <12 IU/ML
HLA-B27 RELATED AG QL: NEGATIVE
M PROTEIN SPEC IFE-MCNC: NORMAL
RF+CCP IGG SER-IMP: NEGATIVE

## 2021-08-12 ENCOUNTER — APPOINTMENT (OUTPATIENT)
Dept: RHEUMATOLOGY | Facility: CLINIC | Age: 56
End: 2021-08-12
Payer: COMMERCIAL

## 2021-08-12 VITALS
HEIGHT: 62 IN | RESPIRATION RATE: 17 BRPM | WEIGHT: 198 LBS | HEART RATE: 82 BPM | TEMPERATURE: 98 F | BODY MASS INDEX: 36.44 KG/M2 | DIASTOLIC BLOOD PRESSURE: 80 MMHG | SYSTOLIC BLOOD PRESSURE: 120 MMHG | OXYGEN SATURATION: 98 %

## 2021-08-12 DIAGNOSIS — M19.90 UNSPECIFIED OSTEOARTHRITIS, UNSPECIFIED SITE: ICD-10-CM

## 2021-08-12 DIAGNOSIS — M75.21 BICIPITAL TENDINITIS, RIGHT SHOULDER: ICD-10-CM

## 2021-08-12 DIAGNOSIS — M75.52 BURSITIS OF LEFT SHOULDER: ICD-10-CM

## 2021-08-12 DIAGNOSIS — M75.22 BICIPITAL TENDINITIS, LEFT SHOULDER: ICD-10-CM

## 2021-08-12 DIAGNOSIS — M77.8 OTHER ENTHESOPATHIES, NOT ELSEWHERE CLASSIFIED: ICD-10-CM

## 2021-08-12 DIAGNOSIS — M13.0 POLYARTHRITIS, UNSPECIFIED: ICD-10-CM

## 2021-08-12 DIAGNOSIS — M25.50 PAIN IN UNSPECIFIED JOINT: ICD-10-CM

## 2021-08-12 PROCEDURE — G2212 PROLONG OUTPT/OFFICE VIS: CPT

## 2021-08-12 PROCEDURE — 99215 OFFICE O/P EST HI 40 MIN: CPT | Mod: 25

## 2021-08-12 RX ORDER — ZINC SULFATE TAB 220 MG (50 MG ZINC EQUIVALENT) 220 (50 ZN) MG
220 (50 ZN) TAB ORAL
Refills: 0 | Status: DISCONTINUED | COMMUNITY
End: 2021-08-12

## 2021-08-12 RX ORDER — ASPIRIN ENTERIC COATED TABLETS 81 MG 81 MG/1
81 TABLET, DELAYED RELEASE ORAL
Qty: 100 | Refills: 0 | Status: DISCONTINUED | COMMUNITY
Start: 2021-08-03 | End: 2021-08-12

## 2021-08-16 ENCOUNTER — LABORATORY RESULT (OUTPATIENT)
Age: 56
End: 2021-08-16

## 2021-08-22 PROBLEM — M77.8 DELTOID TENDINITIS, LEFT: Status: ACTIVE | Noted: 2021-07-30

## 2021-08-22 PROBLEM — M19.90 INFLAMMATORY ARTHRITIS: Noted: 2021-07-30

## 2021-08-22 PROBLEM — M25.50 ARTHRALGIA: Noted: 2021-07-30

## 2021-08-22 PROBLEM — M75.22 BICIPITAL TENDINITIS, LEFT: Noted: 2021-07-30

## 2021-08-22 PROBLEM — M75.52 SUBACROMIAL BURSITIS OF LEFT SHOULDER JOINT: Status: ACTIVE | Noted: 2021-07-30

## 2021-08-22 RX ORDER — CALCIUM 500 MG
500 TABLET ORAL
Qty: 90 | Refills: 3 | Status: ACTIVE | COMMUNITY
Start: 2021-08-22

## 2021-08-22 RX ORDER — UBIDECARENONE/VIT E ACET 100MG-5
CAPSULE ORAL
Refills: 0 | Status: DISCONTINUED | COMMUNITY
End: 2021-08-22

## 2021-08-22 RX ORDER — MAGNESIUM OXIDE/MAG AA CHELATE 300 MG
CAPSULE ORAL
Refills: 0 | Status: ACTIVE | COMMUNITY

## 2021-08-22 RX ORDER — MELOXICAM 15 MG/1
15 TABLET ORAL
Qty: 30 | Refills: 1 | Status: DISCONTINUED | COMMUNITY
Start: 2021-08-03 | End: 2021-08-22

## 2021-08-23 ENCOUNTER — NON-APPOINTMENT (OUTPATIENT)
Age: 56
End: 2021-08-23

## 2021-08-24 ENCOUNTER — NON-APPOINTMENT (OUTPATIENT)
Age: 56
End: 2021-08-24

## 2021-10-14 ENCOUNTER — APPOINTMENT (OUTPATIENT)
Dept: RHEUMATOLOGY | Facility: CLINIC | Age: 56
End: 2021-10-14
Payer: COMMERCIAL

## 2021-10-14 VITALS
HEART RATE: 90 BPM | DIASTOLIC BLOOD PRESSURE: 68 MMHG | WEIGHT: 184 LBS | HEIGHT: 62 IN | RESPIRATION RATE: 17 BRPM | BODY MASS INDEX: 33.86 KG/M2 | SYSTOLIC BLOOD PRESSURE: 130 MMHG | TEMPERATURE: 98.2 F | OXYGEN SATURATION: 98 %

## 2021-10-14 DIAGNOSIS — M25.562 PAIN IN RIGHT KNEE: ICD-10-CM

## 2021-10-14 DIAGNOSIS — M25.561 PAIN IN RIGHT KNEE: ICD-10-CM

## 2021-10-14 DIAGNOSIS — R22.31 LOCALIZED SWELLING, MASS AND LUMP, RIGHT UPPER LIMB: ICD-10-CM

## 2021-10-14 DIAGNOSIS — G89.29 PAIN IN RIGHT KNEE: ICD-10-CM

## 2021-10-14 DIAGNOSIS — R22.32 LOCALIZED SWELLING, MASS AND LUMP, RIGHT UPPER LIMB: ICD-10-CM

## 2021-10-14 PROCEDURE — 99214 OFFICE O/P EST MOD 30 MIN: CPT

## 2021-10-14 RX ORDER — ERGOCALCIFEROL 1.25 MG/1
1.25 MG CAPSULE, LIQUID FILLED ORAL
Qty: 12 | Refills: 0 | Status: ACTIVE | COMMUNITY
Start: 2021-08-22 | End: 1900-01-01

## 2021-10-14 RX ORDER — ETODOLAC 400 MG/1
400 TABLET, FILM COATED, EXTENDED RELEASE ORAL
Qty: 180 | Refills: 0 | Status: DISCONTINUED | COMMUNITY
Start: 2021-08-22 | End: 2021-10-14

## 2021-10-24 RX ORDER — ASPIRIN ENTERIC COATED TABLETS 81 MG 81 MG/1
81 TABLET, DELAYED RELEASE ORAL
Qty: 100 | Refills: 0 | Status: ACTIVE | COMMUNITY
Start: 2021-08-22

## 2021-11-20 ENCOUNTER — RX RENEWAL (OUTPATIENT)
Age: 56
End: 2021-11-20

## 2021-12-17 ENCOUNTER — APPOINTMENT (OUTPATIENT)
Dept: RHEUMATOLOGY | Facility: CLINIC | Age: 56
End: 2021-12-17
Payer: COMMERCIAL

## 2021-12-17 VITALS
HEIGHT: 62 IN | RESPIRATION RATE: 17 BRPM | OXYGEN SATURATION: 98 % | TEMPERATURE: 98.1 F | DIASTOLIC BLOOD PRESSURE: 80 MMHG | BODY MASS INDEX: 33.13 KG/M2 | SYSTOLIC BLOOD PRESSURE: 150 MMHG | WEIGHT: 180 LBS | HEART RATE: 95 BPM

## 2021-12-17 DIAGNOSIS — M15.9 POLYOSTEOARTHRITIS, UNSPECIFIED: ICD-10-CM

## 2021-12-17 DIAGNOSIS — G89.29 PAIN IN LEFT KNEE: ICD-10-CM

## 2021-12-17 DIAGNOSIS — Z79.1 LONG TERM (CURRENT) USE OF NON-STEROIDAL ANTI-INFLAMMATORIES (NSAID): ICD-10-CM

## 2021-12-17 DIAGNOSIS — M06.00 RHEUMATOID ARTHRITIS W/OUT RHEUMATOID FACTOR, UNSPECIFIED SITE: ICD-10-CM

## 2021-12-17 DIAGNOSIS — Z79.899 ENCOUNTER FOR THERAPEUTIC DRUG LVL MONITORING: ICD-10-CM

## 2021-12-17 DIAGNOSIS — Z51.81 ENCOUNTER FOR THERAPEUTIC DRUG LVL MONITORING: ICD-10-CM

## 2021-12-17 DIAGNOSIS — M85.80 OTHER SPECIFIED DISORDERS OF BONE DENSITY AND STRUCTURE, UNSPECIFIED SITE: ICD-10-CM

## 2021-12-17 DIAGNOSIS — M70.50 OTHER BURSITIS OF KNEE, UNSPECIFIED KNEE: ICD-10-CM

## 2021-12-17 DIAGNOSIS — M25.562 PAIN IN LEFT KNEE: ICD-10-CM

## 2021-12-17 PROCEDURE — 36415 COLL VENOUS BLD VENIPUNCTURE: CPT

## 2021-12-17 PROCEDURE — 99215 OFFICE O/P EST HI 40 MIN: CPT | Mod: 25

## 2021-12-17 RX ORDER — FLURBIPROFEN 100 MG
100 TABLET ORAL
Qty: 270 | Refills: 0 | Status: DISCONTINUED | COMMUNITY
Start: 2021-10-14 | End: 2021-12-17

## 2021-12-17 RX ORDER — SULINDAC 150 MG/1
150 TABLET ORAL
Qty: 180 | Refills: 0 | Status: ACTIVE | COMMUNITY
Start: 2021-12-17 | End: 1900-01-01

## 2021-12-17 RX ORDER — DICLOFENAC SODIUM 1% 10 MG/G
1 GEL TOPICAL
Qty: 9 | Refills: 0 | Status: ACTIVE | COMMUNITY
Start: 2021-12-17 | End: 1900-01-01

## 2021-12-22 LAB
ALBUMIN SERPL ELPH-MCNC: 4.5 G/DL
ALP BLD-CCNC: 103 U/L
ALT SERPL-CCNC: 28 U/L
ANION GAP SERPL CALC-SCNC: 11 MMOL/L
AST SERPL-CCNC: 24 U/L
BASOPHILS # BLD AUTO: 0.05 K/UL
BASOPHILS NFR BLD AUTO: 0.6 %
BILIRUB SERPL-MCNC: 0.2 MG/DL
BUN SERPL-MCNC: 13 MG/DL
CALCIUM SERPL-MCNC: 10 MG/DL
CHLORIDE SERPL-SCNC: 100 MMOL/L
CK SERPL-CCNC: 99 U/L
CO2 SERPL-SCNC: 27 MMOL/L
CREAT SERPL-MCNC: 0.6 MG/DL
CRP SERPL-MCNC: 8 MG/L
EOSINOPHIL # BLD AUTO: 0.22 K/UL
EOSINOPHIL NFR BLD AUTO: 2.7 %
ERYTHROCYTE [SEDIMENTATION RATE] IN BLOOD BY WESTERGREN METHOD: 29 MM/HR
GLUCOSE SERPL-MCNC: 100 MG/DL
HCT VFR BLD CALC: 43.1 %
HGB BLD-MCNC: 13.4 G/DL
IMM GRANULOCYTES NFR BLD AUTO: 0.2 %
LDH SERPL-CCNC: 203 U/L
LYMPHOCYTES # BLD AUTO: 1.75 K/UL
LYMPHOCYTES NFR BLD AUTO: 21.6 %
MAN DIFF?: NORMAL
MCHC RBC-ENTMCNC: 26.6 PG
MCHC RBC-ENTMCNC: 31.1 GM/DL
MCV RBC AUTO: 85.7 FL
MONOCYTES # BLD AUTO: 0.65 K/UL
MONOCYTES NFR BLD AUTO: 8 %
NEUTROPHILS # BLD AUTO: 5.42 K/UL
NEUTROPHILS NFR BLD AUTO: 66.9 %
PLATELET # BLD AUTO: 261 K/UL
POTASSIUM SERPL-SCNC: 4.2 MMOL/L
PROT SERPL-MCNC: 7.3 G/DL
RBC # BLD: 5.03 M/UL
RBC # FLD: 15.3 %
SODIUM SERPL-SCNC: 138 MMOL/L
WBC # FLD AUTO: 8.11 K/UL

## 2021-12-22 NOTE — HISTORY OF PRESENT ILLNESS
[FreeTextEntry1] : 56 year-old woman for follow up office visit regarding her joint symptoms and rheumatic diseases, including seronegative rheumatoid arthritis, osteoarthritis, osteopenia.\par \par Patient feels much better. She has persistent pain in left knee especially after prolonged sitting, neck with occasional radiation to bilateral arms to the fingers. Occasional easy bruising when she takes prophylactic aspirin. Denies epistaxis. The patient continues calcium and vitamin D supplements. Patient denies rash or side effects with current medications. Patient is content with current medication regimen. \par \par PMH:\par S/P coronavirus vaccine x3 with transient fever x1 day \par Elevated BP--PCP aware

## 2021-12-22 NOTE — ADDENDUM
[FreeTextEntry1] : I, Ca Muniz, acted solely as a scribe for Dr. Myron I. Kleiner, MD. on 12/17/2021 .\par \par All medical record entries made by the Scribe were at Dr. Myron I. Kleiner, MD, direction and personally dictated by me on 12/17/2021. I have personally reviewed the chart and agree that the record accurately reflects my personal performance of the history, physical exam, assessment and plan.

## 2021-12-22 NOTE — CONSULT LETTER
[Dear  ___] : Dear  [unfilled], [Consult Letter:] : I had the pleasure of evaluating your patient, [unfilled]. [Please see my note below.] : Please see my note below. [Sincerely,] : Sincerely, [Consult Closing:] : Thank you very much for allowing me to participate in the care of this patient.  If you have any questions, please do not hesitate to contact me. [FreeTextEntry3] : Yuriy\par Myron I. Kleiner, M.D., FACR\par Chief, Division of Rheumatology\par Department of Medicine\par [facility]

## 2021-12-22 NOTE — ASSESSMENT
[FreeTextEntry1] : Impression: 56 year-old woman for follow up office visit regarding her joint symptoms and rheumatic diseases, including seronegative rheumatoid arthritis, osteoarthritis, osteopenia.\par \par Patient feels much better, however on exam she has persistent synovial proliferation bilateral MCP/PIPs secondary to synovitis secondary to active rheumatoid arthritis. She is tolerating the sulfasalazine without side effect or complication - we will give it more time to work.   Her osteoarthritis is also contributing to her arthralgias. She has right bicipital tendonitis. Occasional easy bruising when she takes prophylactic aspirin. Denies epistaxis. The patient continues calcium and vitamin D supplements for her osteopenia. Patient denies rash or side effects with current medications. Patient is content with current medication regimen. \par \par Plan: I reviewed previous lab results with patient with extensive discussion\par Laboratory tests ordered today-see list below\par Diagnosis and prognosis discussed\par Continue current medications (other than those changed below)\par Discontinue Flurbiprofen\par Sulindac 150 mg b.i.d. at end of breakfast and supper (Possible side effects explained including cardiovascular risk/MI/CVA) \par Diclofenac gel 4 g to left knee twice daily (Possible side effects explained including cardiovascular risk/MI/CVA) \par Continue CBC/platelet count q.2 weeks until next visit\par Return visit 2 months

## 2022-01-06 ENCOUNTER — NON-APPOINTMENT (OUTPATIENT)
Age: 57
End: 2022-01-06

## 2022-01-17 ENCOUNTER — RX RENEWAL (OUTPATIENT)
Age: 57
End: 2022-01-17

## 2022-01-17 RX ORDER — SULFASALAZINE 500 MG/1
500 TABLET ORAL
Qty: 180 | Refills: 0 | Status: ACTIVE | COMMUNITY
Start: 2021-08-22 | End: 1900-01-01

## 2022-01-20 ENCOUNTER — RX RENEWAL (OUTPATIENT)
Age: 57
End: 2022-01-20

## 2022-02-08 ENCOUNTER — APPOINTMENT (OUTPATIENT)
Dept: RHEUMATOLOGY | Facility: CLINIC | Age: 57
End: 2022-02-08

## 2022-02-27 ENCOUNTER — RX RENEWAL (OUTPATIENT)
Age: 57
End: 2022-02-27

## 2022-02-27 RX ORDER — FOLIC ACID 1 MG/1
1 TABLET ORAL DAILY
Qty: 90 | Refills: 0 | Status: ACTIVE | COMMUNITY
Start: 2021-08-22 | End: 1900-01-01

## 2022-04-06 ENCOUNTER — APPOINTMENT (OUTPATIENT)
Dept: ORTHOPEDIC SURGERY | Facility: CLINIC | Age: 57
End: 2022-04-06
Payer: OTHER MISCELLANEOUS

## 2022-04-06 VITALS — BODY MASS INDEX: 34.04 KG/M2 | HEIGHT: 62 IN | WEIGHT: 185 LBS

## 2022-04-06 PROCEDURE — 99213 OFFICE O/P EST LOW 20 MIN: CPT

## 2022-04-06 PROCEDURE — 73010 X-RAY EXAM OF SHOULDER BLADE: CPT | Mod: RT

## 2022-04-06 PROCEDURE — 73030 X-RAY EXAM OF SHOULDER: CPT | Mod: RT

## 2022-04-06 PROCEDURE — 99072 ADDL SUPL MATRL&STAF TM PHE: CPT

## 2022-04-06 NOTE — PHYSICAL EXAM
[] : tenderness over posterior shoulder [4 ___] : forward flexion 4[unfilled]/5 [Right] : right shoulder [No loss of surgical correlation. Bony alignment acceptable. Hardware in appropriate position] : No loss of surgical correlation. Bony alignment acceptable. Hardware in appropriate position [Components well fixed, in good position] : Components well fixed, in good position [FreeTextEntry9] : FE: 140\par ER: 60\par IR: L1

## 2022-04-06 NOTE — WORK
[Total] : total [Does not reveal pre-existing condition(s) that may affect treatment/prognosis] : does not reveal pre-existing condition(s) that may affect treatment/prognosis [Cannot return to work because ________] : cannot return to work because [unfilled] [Unknown at this time] : : unknown at this time [I provided the services listed above] :  I provided the services listed above. [Patient] : patient [No Rx restrictions] : No Rx restrictions.

## 2022-04-06 NOTE — ASSESSMENT
[FreeTextEntry1] : R RCTs.\par Now s/p R RSA.\par Continue PT for PROM, AROM, strengthening as tolerated. \par HEP for stretching.\par Diclofenac prn.\par OOW. \par Will get xrays yearly in April.\par RTO 6 weeks.

## 2022-04-06 NOTE — HISTORY OF PRESENT ILLNESS
[8] : 8 [5] : 5 [Dull/Aching] : dull/aching [Sharp] : sharp [Not working due to injury] : Work status: not working due to injury [de-identified] :  3/16/20 \par DOS: 4/26/21 R RSA\par \par 4/6/22: Here for follow up. She is about a year from surgery. She still has pain at night. She is in PT which helps.\par \par 2/9/22: Follow up R shoulder. She is doing well with PT. She is improving but still has some weakness with lifting and pulling.\par \par 12/29/21: Here for follow-up. Notes improvement with PT.\par \par 11/17/21: Here for follow up. She is in PT with improvement. She has pain at night.\par \par 10/13/21: Here for a follow up. There is still some pain. She is improving with PT.\par \par 9/1/21: Here for follow up. She is in PT with improvement. She has anterior soreness.\par \par 7/21/21: She is here for follow up. She is in PT with improvement. She has pain at night.\par \par 6/9/21: Here for follow up. She is in PT with relief. Her pain has improved.\par \par 5/11/21: Pt is s/p 4/26/21 RSA. Doing well for initial post op visit. Pain controlled, in a sling.\par \par 3/24/21: Follow up right shoulder. Surgery was denied. Symptoms persists.\par \par 3/3/21: Follow up R shoulder. She reports increased pain since shoveling snow at work. She saw Dr. Villegas and had TPI. She tried PT. She would like to discuss surgery.\par \par 1/20/21: Here for follow up. Her pain is still improved from the Sa injection. She was finally approved for PT.\par \par 12/23/20: Here for follow up. She got good relief from the SA injection. She has not yet been approved for PT.\par \par 11/4/20: Patient is a 56yo RHD female who presents for evaluation of right shoulder pain. She fell at work in March and has had pain since. She saw a doctor at her chiro's office and got an injection in May which helped for a short period of time. She also had an MRI. She denies any prior injury to the shoulder.\par \par MRI R shoulder 4/2/20: Large retracted tears involving the entirety of the supraspinatus and the majority of the infraspinatus tendons, both retracted to the level of the superior glenoid margin and each retracted greater than 3.5 cm in size. Extensive fluid of its synovial inflammation extending into the subacromial bursa with distention of the bursa. There is a small remnant of the posteroinferior aspect of the infraspinatus remaining intact with tendinosis. Severe atrophy of the supraspinatus and infraspinatus muscles. Subscapularis tendon is also torn and retracted approximately 2.5 cm in size to the level medial to the lesser tuberosity. The long head of the biceps tendon is dislocated into the anterior glenohumeral joint and is probably intact but severely thickened with tendinosis. The subscapularis muscle demonstrates moderate to severe atrophy. Hypertrophic acromioclavicular joint changes accompanied by a laterally downsloping Type II acromial configuration. Glenohumeral joint demonstrates anterior decentering of the humeral head with respect to the glenoid. Blunting of the free edge of the anterior labrum. Blunting of the free edge of the anterior and superior labrum. Glenohumeral effusion distends the glenohumeral joint with prominent synovial thickening and synovitis at the axillary recess with synovitis and distention of the synovial fluid into the subscapularis recess with fluid distending the subcoracoid bursa, with subcoracoid bursal distention up to 4.5 cm. Subcortical reactive change and erosion at the convexity of the humeral head. [] : Post Surgical Visit: no [FreeTextEntry1] : right shoulder [de-identified] : physical therapy

## 2022-04-13 ENCOUNTER — FORM ENCOUNTER (OUTPATIENT)
Age: 57
End: 2022-04-13

## 2022-04-27 ENCOUNTER — FORM ENCOUNTER (OUTPATIENT)
Age: 57
End: 2022-04-27

## 2022-05-11 ENCOUNTER — APPOINTMENT (OUTPATIENT)
Dept: PAIN MANAGEMENT | Facility: CLINIC | Age: 57
End: 2022-05-11
Payer: OTHER MISCELLANEOUS

## 2022-05-11 VITALS — HEIGHT: 62 IN | BODY MASS INDEX: 34.04 KG/M2 | WEIGHT: 185 LBS

## 2022-05-11 PROCEDURE — J3490M: CUSTOM

## 2022-05-11 PROCEDURE — 20552 NJX 1/MLT TRIGGER POINT 1/2: CPT

## 2022-05-11 PROCEDURE — 99214 OFFICE O/P EST MOD 30 MIN: CPT | Mod: 25

## 2022-05-11 PROCEDURE — 99072 ADDL SUPL MATRL&STAF TM PHE: CPT

## 2022-05-11 NOTE — PHYSICAL EXAM
[] : motor exam is non-focal throughout both lower extremities [de-identified] : 2 dark irregular moles noted on either side of buttocks.  Patient aware and will see dermatologist

## 2022-05-11 NOTE — HISTORY OF PRESENT ILLNESS
[Lower back] : lower back [Work related] : work related [10] : 10 [Radiating] : radiating [Sharp] : sharp [Shooting] : shooting [Stabbing] : stabbing [Tingling] : tingling [Constant] : constant [Sleep] : sleep [Nothing helps with pain getting better] : Nothing helps with pain getting better [Standing] : standing [Walking] : walking [Bending forward] : bending forward [Stairs] : stairs [Not working due to injury] : Work status: not working due to injury [de-identified] : pt states she would like to have an MRI  she is having pain in the lower back and the pain is going into her legs she describes the pain as electric pain shooting down \par Would like repeat injection.  WIll give TPI\par  [] : Patient is currently injured and not playing sports: no [FreeTextEntry3] : 02/12/2021 [FreeTextEntry6] : numbness [FreeTextEntry7] : into her legs

## 2022-05-11 NOTE — ASSESSMENT
[FreeTextEntry1] : The risks, benefits, contents and alternatives to injection were explained in full to the patient.  Risks outlined include but are not limited to infection, sepsis, bleeding, scarring, skin discoloration, temporary increase in pain, syncopal episode, failure to resolve symptoms, allergic reaction, flare reaction, permanent white skin discoloration, symptom recurrence, and elevation of blood sugar in diabetics.  Patient understood the risks.  All questions were answered.  After discussion of options, patient requested an injection.  Oral informed consent was obtained and sterile prep was done of the injection site.  Sterile technique was used to introduce the mixture. The mixture consisted of 3 cc 1% lidocaine, 3cc 0.25% marcaine, and 10mg of kenalog.  Patient tolerated the procedure well.  Patient advised to ice the injection site this evening.  Signs and symptoms of infection reviewed and patient advised to call immediately for redness, fevers, and/or chills.\par

## 2022-05-18 ENCOUNTER — APPOINTMENT (OUTPATIENT)
Dept: ORTHOPEDIC SURGERY | Facility: CLINIC | Age: 57
End: 2022-05-18
Payer: OTHER MISCELLANEOUS

## 2022-05-18 VITALS — BODY MASS INDEX: 34.04 KG/M2 | WEIGHT: 185 LBS | HEIGHT: 62 IN

## 2022-05-18 PROCEDURE — 99072 ADDL SUPL MATRL&STAF TM PHE: CPT

## 2022-05-18 PROCEDURE — 99213 OFFICE O/P EST LOW 20 MIN: CPT

## 2022-05-18 NOTE — HISTORY OF PRESENT ILLNESS
[Dull/Aching] : dull/aching [Intermittent] : intermittent [Rest] : rest [Physical therapy] : physical therapy [8] : 8 [5] : 5 [Sharp] : sharp [Not working due to injury] : Work status: not working due to injury [] : Post Surgical Visit: no [FreeTextEntry1] : right shoulder [de-identified] : motion  [de-identified] : physical therapy [de-identified] :  3/16/20 \par DOS: 4/26/21 R RSA\par \par 5/18/22: Here for follow up.  She is in PT with improvement in her pain.  She has some aching pain anteriorly.   \par \par 4/6/22: Here for follow up. She is about a year from surgery. She still has pain at night. She is in PT which helps.\par \par 2/9/22: Follow up R shoulder. She is doing well with PT. She is improving but still has some weakness with lifting and pulling.\par \par 12/29/21: Here for follow-up. Notes improvement with PT.\par \par 11/17/21: Here for follow up. She is in PT with improvement. She has pain at night.\par \par 10/13/21: Here for a follow up. There is still some pain. She is improving with PT.\par \par 9/1/21: Here for follow up. She is in PT with improvement. She has anterior soreness.\par \par 7/21/21: She is here for follow up. She is in PT with improvement. She has pain at night.\par \par 6/9/21: Here for follow up. She is in PT with relief. Her pain has improved.\par \par 5/11/21: Pt is s/p 4/26/21 RSA. Doing well for initial post op visit. Pain controlled, in a sling.\par \par 3/24/21: Follow up right shoulder. Surgery was denied. Symptoms persists.\par \par 3/3/21: Follow up R shoulder. She reports increased pain since shoveling snow at work. She saw Dr. Villegas and had TPI. She tried PT. She would like to discuss surgery.\par \par 1/20/21: Here for follow up. Her pain is still improved from the Sa injection. She was finally approved for PT.\par \par 12/23/20: Here for follow up. She got good relief from the SA injection. She has not yet been approved for PT.\par \par 11/4/20: Patient is a 56yo RHD female who presents for evaluation of right shoulder pain. She fell at work in March and has had pain since. She saw a doctor at her chiro's office and got an injection in May which helped for a short period of time. She also had an MRI. She denies any prior injury to the shoulder.\par \par MRI R shoulder 4/2/20: Large retracted tears involving the entirety of the supraspinatus and the majority of the infraspinatus tendons, both retracted to the level of the superior glenoid margin and each retracted greater than 3.5 cm in size. Extensive fluid of its synovial inflammation extending into the subacromial bursa with distention of the bursa. There is a small remnant of the posteroinferior aspect of the infraspinatus remaining intact with tendinosis. Severe atrophy of the supraspinatus and infraspinatus muscles. Subscapularis tendon is also torn and retracted approximately 2.5 cm in size to the level medial to the lesser tuberosity. The long head of the biceps tendon is dislocated into the anterior glenohumeral joint and is probably intact but severely thickened with tendinosis. The subscapularis muscle demonstrates moderate to severe atrophy. Hypertrophic acromioclavicular joint changes accompanied by a laterally downsloping Type II acromial configuration. Glenohumeral joint demonstrates anterior decentering of the humeral head with respect to the glenoid. Blunting of the free edge of the anterior labrum. Blunting of the free edge of the anterior and superior labrum. Glenohumeral effusion distends the glenohumeral joint with prominent synovial thickening and synovitis at the axillary recess with synovitis and distention of the synovial fluid into the subscapularis recess with fluid distending the subcoracoid bursa, with subcoracoid bursal distention up to 4.5 cm. Subcortical reactive change and erosion at the convexity of the humeral head.

## 2022-05-18 NOTE — PHYSICAL EXAM
[4 ___] : forward flexion 4[unfilled]/5 [Right] : right shoulder [] : tenderness at anterior shoulder [FreeTextEntry9] : FE: 140\par ER: 60\par IR: sacrum\par IR: L1

## 2022-05-18 NOTE — ASSESSMENT
[FreeTextEntry1] : R RCTs.\par Now s/p R RSA.\par Continue PT for PROM, AROM, strengthening as tolerated. \par HEP for stretching.\par Diclofenac prn.\par She is retired. \par Will get xrays yearly in April.\par RTO 6 weeks.

## 2022-06-07 ENCOUNTER — APPOINTMENT (OUTPATIENT)
Dept: ORTHOPEDIC SURGERY | Facility: AMBULATORY SURGERY CENTER | Age: 57
End: 2022-06-07

## 2022-06-22 ENCOUNTER — APPOINTMENT (OUTPATIENT)
Dept: PAIN MANAGEMENT | Facility: CLINIC | Age: 57
End: 2022-06-22
Payer: OTHER MISCELLANEOUS

## 2022-06-22 VITALS — WEIGHT: 185 LBS | BODY MASS INDEX: 34.04 KG/M2 | HEIGHT: 62 IN

## 2022-06-22 PROCEDURE — 99214 OFFICE O/P EST MOD 30 MIN: CPT

## 2022-06-22 PROCEDURE — 99072 ADDL SUPL MATRL&STAF TM PHE: CPT

## 2022-06-22 NOTE — PHYSICAL EXAM
[de-identified] : PHYSICAL EXAM\par \par Constitutional: \par Appears well, no apparent distress\par Ability to communicate: Normal\par Respiratory: non-labored breathing\par Skin: no rash noted\par Head: normocephalic, atraumatic\par Neck: no visible thyroid enlargement\par Eyes: extraocular movements intact\par Neurologic: alert and oriented x3\par Psychiatric: normal mood, affect, and behavior\par \par Lumbar Spine: \par Palpation: left and right lumbar paraspinal spasm and left and right lumbar paraspinal tenderness to palpation.\par ROM: Diminished range of motion in all plains.  Patient notes pain with lateral bending to the left and right.\par MMT: Motor exam is 5/5 through out bilateral lower extremities.\par Sensation: Light touch and pain is intact throughout bilateral lower extremities.\par Reflexes: achilles and patella reflexes are intact and  symmetrical.  No sustained clonus.\par Special Testing: Positive straight leg raise on the left and right side.\par \par Assessemnt:\par Radiculopathy of lumbosacral region (M54.17)\par Myalgia (M79.10)\par \par Plan:\par After discussing various treatment options with the patient including but not limited to oral medications, physical therapy, exercise modalities as well as interventional spinal injections, we have decided with the following plan:\par The patient is presenting with acute/sub-acute radicular pain with impairment in ADLs and functionality.  The pain has not responded to conservative care including NSAID therapy and/or physical therapy.  There is no bleeding tendency, unstable medical condition, or systemic infection\par \par The risks, benefits and alternatives of the proposed procedure were explained in detail with the patient.  The risks outlined include but are not limited to infection, bleeding, post dural puncture headache, nerve injury, a temporary increase in pain, failure to resolve symptoms, allergic reaction, symptom recurrence, and possible elevation of blood sugar.  All questions were answered to patient's satisfaction and he/she verbalized an understanding.\par \par Follow up 1-2 weeks post injection foe re-evaluation.\par \par Continue home exercises, stretching, activity modification, physical therapy, and conservative care.\par \par \par \par

## 2022-06-22 NOTE — HISTORY OF PRESENT ILLNESS
[Lower back] : lower back [Work related] : work related [9] : 9 [10] : 10 [Burning] : burning [Radiating] : radiating [Shooting] : shooting [Constant] : constant [Household chores] : household chores [Leisure] : leisure [Sleep] : sleep [Heat] : heat [Physical therapy] : physical therapy [Sitting] : sitting [Standing] : standing [Walking] : walking [Stairs] : stairs [Lying in bed] : lying in bed [] : no [FreeTextEntry3] : 2/12/21 [FreeTextEntry7] : b/l legs

## 2022-06-24 ENCOUNTER — APPOINTMENT (OUTPATIENT)
Dept: RHEUMATOLOGY | Facility: CLINIC | Age: 57
End: 2022-06-24

## 2022-06-29 ENCOUNTER — APPOINTMENT (OUTPATIENT)
Dept: ORTHOPEDIC SURGERY | Facility: CLINIC | Age: 57
End: 2022-06-29
Payer: OTHER MISCELLANEOUS

## 2022-06-29 VITALS — HEIGHT: 62 IN | WEIGHT: 185 LBS | BODY MASS INDEX: 34.04 KG/M2

## 2022-06-29 DIAGNOSIS — Z98.890 OTHER SPECIFIED POSTPROCEDURAL STATES: ICD-10-CM

## 2022-06-29 PROCEDURE — 99213 OFFICE O/P EST LOW 20 MIN: CPT

## 2022-06-29 PROCEDURE — 99072 ADDL SUPL MATRL&STAF TM PHE: CPT

## 2022-06-29 NOTE — PHYSICAL EXAM
[Right] : right shoulder [4 ___] : forward flexion 4[unfilled]/5 [] : motor and sensory intact distally [FreeTextEntry9] : FE: 140\par ER: 60\par IR: sacrum\par IR: L1

## 2022-06-29 NOTE — HISTORY OF PRESENT ILLNESS
[Dull/Aching] : dull/aching [8] : 8 [5] : 5 [Sharp] : sharp [Intermittent] : intermittent [Rest] : rest [Physical therapy] : physical therapy [Not working due to injury] : Work status: not working due to injury [] : Post Surgical Visit: no [FreeTextEntry1] : right shoulder [de-identified] : motion  [de-identified] : physical therapy [de-identified] :  3/16/20 \par DOS: 4/26/21 R RSA\par \par 6/29/22: Follow up R shoulder. She feels she is continuing to improve. She has finished PT. There is some soreness at night.\par \par 5/18/22: Here for follow up.  She is in PT with improvement in her pain.  She has some aching pain anteriorly.   \par \par 4/6/22: Here for follow up. She is about a year from surgery. She still has pain at night. She is in PT which helps.\par \par 2/9/22: Follow up R shoulder. She is doing well with PT. She is improving but still has some weakness with lifting and pulling.\par \par 12/29/21: Here for follow-up. Notes improvement with PT.\par \par 11/17/21: Here for follow up. She is in PT with improvement. She has pain at night.\par \par 10/13/21: Here for a follow up. There is still some pain. She is improving with PT.\par \par 9/1/21: Here for follow up. She is in PT with improvement. She has anterior soreness.\par \par 7/21/21: She is here for follow up. She is in PT with improvement. She has pain at night.\par \par 6/9/21: Here for follow up. She is in PT with relief. Her pain has improved.\par \par 5/11/21: Pt is s/p 4/26/21 RSA. Doing well for initial post op visit. Pain controlled, in a sling.\par \par 3/24/21: Follow up right shoulder. Surgery was denied. Symptoms persists.\par \par 3/3/21: Follow up R shoulder. She reports increased pain since shoveling snow at work. She saw Dr. Villegas and had TPI. She tried PT. She would like to discuss surgery.\par \par 1/20/21: Here for follow up. Her pain is still improved from the Sa injection. She was finally approved for PT.\par \par 12/23/20: Here for follow up. She got good relief from the SA injection. She has not yet been approved for PT.\par \par 11/4/20: Patient is a 56yo RHD female who presents for evaluation of right shoulder pain. She fell at work in March and has had pain since. She saw a doctor at her chiro's office and got an injection in May which helped for a short period of time. She also had an MRI. She denies any prior injury to the shoulder.\par \par MRI R shoulder 4/2/20: Large retracted tears involving the entirety of the supraspinatus and the majority of the infraspinatus tendons, both retracted to the level of the superior glenoid margin and each retracted greater than 3.5 cm in size. Extensive fluid of its synovial inflammation extending into the subacromial bursa with distention of the bursa. There is a small remnant of the posteroinferior aspect of the infraspinatus remaining intact with tendinosis. Severe atrophy of the supraspinatus and infraspinatus muscles. Subscapularis tendon is also torn and retracted approximately 2.5 cm in size to the level medial to the lesser tuberosity. The long head of the biceps tendon is dislocated into the anterior glenohumeral joint and is probably intact but severely thickened with tendinosis. The subscapularis muscle demonstrates moderate to severe atrophy. Hypertrophic acromioclavicular joint changes accompanied by a laterally downsloping Type II acromial configuration. Glenohumeral joint demonstrates anterior decentering of the humeral head with respect to the glenoid. Blunting of the free edge of the anterior labrum. Blunting of the free edge of the anterior and superior labrum. Glenohumeral effusion distends the glenohumeral joint with prominent synovial thickening and synovitis at the axillary recess with synovitis and distention of the synovial fluid into the subscapularis recess with fluid distending the subcoracoid bursa, with subcoracoid bursal distention up to 4.5 cm. Subcortical reactive change and erosion at the convexity of the humeral head.

## 2022-06-29 NOTE — ASSESSMENT
[FreeTextEntry1] : R RCTs.\par Now s/p R RSA.\par She completed and improved with PT.\par HEP for stretching.\par Diclofenac prn.\par She is retired. \par Will get xrays yearly in April.\par RTO 3 months.

## 2022-07-05 ENCOUNTER — APPOINTMENT (OUTPATIENT)
Age: 57
End: 2022-07-05

## 2022-07-05 PROCEDURE — 62323 NJX INTERLAMINAR LMBR/SAC: CPT

## 2022-07-07 LAB — SARS-COV-2 N GENE NPH QL NAA+PROBE: NOT DETECTED

## 2022-07-20 ENCOUNTER — APPOINTMENT (OUTPATIENT)
Dept: PAIN MANAGEMENT | Facility: CLINIC | Age: 57
End: 2022-07-20

## 2022-07-20 VITALS — BODY MASS INDEX: 34.04 KG/M2 | WEIGHT: 185 LBS | HEIGHT: 62 IN

## 2022-07-20 PROCEDURE — 99072 ADDL SUPL MATRL&STAF TM PHE: CPT

## 2022-07-20 PROCEDURE — 99214 OFFICE O/P EST MOD 30 MIN: CPT

## 2022-07-20 NOTE — PHYSICAL EXAM
[de-identified] : PHYSICAL EXAM\par \par Constitutional: \par Appears well, no apparent distress\par Ability to communicate: Normal\par Respiratory: non-labored breathing\par Skin: no rash noted\par Head: normocephalic, atraumatic\par Neck: no visible thyroid enlargement\par Eyes: extraocular movements intact\par Neurologic: alert and oriented x3\par Psychiatric: normal mood, affect, and behavior\par \par Lumbar Spine: \par Palpation: left and right lumbar paraspinal spasm and left and right lumbar paraspinal tenderness to palpation.\par ROM: Diminished range of motion in all plains.  Patient notes pain with lateral bending to the left and right.\par MMT: Motor exam is 5/5 through out bilateral lower extremities.\par Sensation: Light touch and pain is intact throughout bilateral lower extremities.\par Reflexes: achilles and patella reflexes are intact and  symmetrical.  No sustained clonus.\par Special Testing: Positive straight leg raise on the left and right side.\par \par Assessemnt:\par Radiculopathy of lumbosacral region (M54.17)\par Myalgia (M79.10)\par \par Plan:\par After discussing various treatment options with the patient including but not limited to oral medications, physical therapy, exercise modalities as well as interventional spinal injections, we have decided with the following plan:\par The patient is presenting with acute/sub-acute radicular pain with impairment in ADLs and functionality.  The pain has not responded to conservative care including NSAID therapy and/or physical therapy.  There is no bleeding tendency, unstable medical condition, or systemic infection\par \par The risks, benefits and alternatives of the proposed procedure were explained in detail with the patient.  The risks outlined include but are not limited to infection, bleeding, post dural puncture headache, nerve injury, a temporary increase in pain, failure to resolve symptoms, allergic reaction, symptom recurrence, and possible elevation of blood sugar.  All questions were answered to patient's satisfaction and he/she verbalized an understanding.\par \par Follow up 1-2 weeks post injection foe re-evaluation.\par \par Continue home exercises, stretching, activity modification, physical therapy, and conservative care.\par \par \par \par

## 2022-07-20 NOTE — HISTORY OF PRESENT ILLNESS
[Lower back] : lower back [Tingling] : tingling [Constant] : constant [Rest] : rest [Injection therapy] : injection therapy [Standing] : standing [Walking] : walking [Work related] : work related [9] : 9 [10] : 10 [Burning] : burning [Radiating] : radiating [Shooting] : shooting [Household chores] : household chores [Leisure] : leisure [Sleep] : sleep [Heat] : heat [Physical therapy] : physical therapy [Sitting] : sitting [Stairs] : stairs [Lying in bed] : lying in bed [de-identified] : pt is following up after epidural she states it helped her 70% , she is still having numbness, electric pains on the right side  [] : Patient is currently injured and not playing sports: no [FreeTextEntry3] : 2/12/21 [FreeTextEntry6] : numbness, electric feels  [FreeTextEntry7] : b/l legs

## 2022-07-20 NOTE — DISCUSSION/SUMMARY
[de-identified] : 70% relief of pain improved rom and adls, some improvement with paresthesias in R leg\par \par states that although still has paresthesias in rle\par \par will repeat caudal ricki, greatest relief with this approach \par \par will trial gabapentin

## 2022-08-15 LAB — SARS-COV-2 N GENE NPH QL NAA+PROBE: NOT DETECTED

## 2022-08-16 ENCOUNTER — APPOINTMENT (OUTPATIENT)
Age: 57
End: 2022-08-16

## 2022-08-16 PROCEDURE — 62323 NJX INTERLAMINAR LMBR/SAC: CPT

## 2022-08-17 ENCOUNTER — FORM ENCOUNTER (OUTPATIENT)
Age: 57
End: 2022-08-17

## 2022-09-01 ENCOUNTER — APPOINTMENT (OUTPATIENT)
Dept: PAIN MANAGEMENT | Facility: CLINIC | Age: 57
End: 2022-09-01

## 2022-09-01 VITALS — HEIGHT: 62 IN | BODY MASS INDEX: 34.04 KG/M2 | WEIGHT: 185 LBS

## 2022-09-01 PROCEDURE — 99214 OFFICE O/P EST MOD 30 MIN: CPT

## 2022-09-01 PROCEDURE — 99072 ADDL SUPL MATRL&STAF TM PHE: CPT

## 2022-09-01 NOTE — DISCUSSION/SUMMARY
[de-identified] : 70% relief of pain improved rom and adls, some improvement with paresthesias in R leg\par \par states that although still has paresthesias in rle\par \par states that she feels she has some weakness in RLE.  Declines surgical intervention  Recommend PT for leg strengthening\par \par will resume PT and re-assess 6 weeks

## 2022-09-01 NOTE — HISTORY OF PRESENT ILLNESS
[Lower back] : lower back [Work related] : work related [Household chores] : household chores [Sleep] : sleep [Rest] : rest [Ice] : ice [Injection therapy] : injection therapy [Standing] : standing [Walking] : walking [Lying in bed] : lying in bed [Not working due to injury] : Work status: not working due to injury [9] : 9 [10] : 10 [Burning] : burning [Radiating] : radiating [Shooting] : shooting [Tingling] : tingling [Constant] : constant [Leisure] : leisure [Heat] : heat [Physical therapy] : physical therapy [Sitting] : sitting [Stairs] : stairs [de-identified] : pt is following up after epidural she states it helped her 70% , she is still having numbness, electric pains on the right side  [] : no [FreeTextEntry3] : 2/12/21 [FreeTextEntry6] : numbness, electric feels  [FreeTextEntry7] : b/l legs [TWNoteComboBox1] : 70%

## 2022-09-01 NOTE — PHYSICAL EXAM
[de-identified] : PHYSICAL EXAM\par \par Constitutional: \par Appears well, no apparent distress\par Ability to communicate: Normal\par Respiratory: non-labored breathing\par Skin: no rash noted\par Head: normocephalic, atraumatic\par Neck: no visible thyroid enlargement\par Eyes: extraocular movements intact\par Neurologic: alert and oriented x3\par Psychiatric: normal mood, affect, and behavior\par \par Lumbar Spine: \par Palpation: left and right lumbar paraspinal spasm and left and right lumbar paraspinal tenderness to palpation.\par ROM: Diminished range of motion in all plains.  Patient notes pain with lateral bending to the left and right.\par MMT: Motor exam is 5/5 through out bilateral lower extremities.\par Sensation: Light touch and pain is intact throughout bilateral lower extremities.\par Reflexes: achilles and patella reflexes are intact and  symmetrical.  No sustained clonus.\par Special Testing: Positive straight leg raise on the left and right side.\par \par Assessemnt:\par Radiculopathy of lumbosacral region (M54.17)\par Myalgia (M79.10)\par \par Plan:\par After discussing various treatment options with the patient including but not limited to oral medications, physical therapy, exercise modalities as well as interventional spinal injections, we have decided with the following plan:\par The patient is presenting with acute/sub-acute radicular pain with impairment in ADLs and functionality.  The pain has not responded to conservative care including NSAID therapy and/or physical therapy.  There is no bleeding tendency, unstable medical condition, or systemic infection\par \par The risks, benefits and alternatives of the proposed procedure were explained in detail with the patient.  The risks outlined include but are not limited to infection, bleeding, post dural puncture headache, nerve injury, a temporary increase in pain, failure to resolve symptoms, allergic reaction, symptom recurrence, and possible elevation of blood sugar.  All questions were answered to patient's satisfaction and he/she verbalized an understanding.\par \par Follow up 1-2 weeks post injection foe re-evaluation.\par \par Continue home exercises, stretching, activity modification, physical therapy, and conservative care.\par \par \par \par

## 2022-09-14 ENCOUNTER — APPOINTMENT (OUTPATIENT)
Dept: ORTHOPEDIC SURGERY | Facility: CLINIC | Age: 57
End: 2022-09-14

## 2022-09-14 VITALS — WEIGHT: 185 LBS | BODY MASS INDEX: 34.04 KG/M2 | HEIGHT: 62 IN

## 2022-09-14 DIAGNOSIS — Z96.611 PRESENCE OF RIGHT ARTIFICIAL SHOULDER JOINT: ICD-10-CM

## 2022-09-14 DIAGNOSIS — M75.101 UNSPECIFIED ROTATOR CUFF TEAR OR RUPTURE OF RIGHT SHOULDER, NOT SPECIFIED AS TRAUMATIC: ICD-10-CM

## 2022-09-14 DIAGNOSIS — M12.811 UNSPECIFIED ROTATOR CUFF TEAR OR RUPTURE OF RIGHT SHOULDER, NOT SPECIFIED AS TRAUMATIC: ICD-10-CM

## 2022-09-14 PROCEDURE — 99455 WORK RELATED DISABILITY EXAM: CPT

## 2022-09-14 PROCEDURE — 99072 ADDL SUPL MATRL&STAF TM PHE: CPT

## 2022-09-14 NOTE — HISTORY OF PRESENT ILLNESS
[5] : 5 [0] : 0 [FreeTextEntry1] : right shoulder [de-identified] :  3/16/20 \par DOS: 4/26/21 R RSA\par \par 9/14/22: Here today for an SLU. \par \par 6/29/22: Follow up R shoulder. She feels she is continuing to improve. She has finished PT. There is some soreness at night.\par \par 5/18/22: Here for follow up.  She is in PT with improvement in her pain.  She has some aching pain anteriorly.   \par \par 4/6/22: Here for follow up. She is about a year from surgery. She still has pain at night. She is in PT which helps.\par \par 2/9/22: Follow up R shoulder. She is doing well with PT. She is improving but still has some weakness with lifting and pulling.\par \par 12/29/21: Here for follow-up. Notes improvement with PT.\par \par 11/17/21: Here for follow up. She is in PT with improvement. She has pain at night.\par \par 10/13/21: Here for a follow up. There is still some pain. She is improving with PT.\par \par 9/1/21: Here for follow up. She is in PT with improvement. She has anterior soreness.\par \par 7/21/21: She is here for follow up. She is in PT with improvement. She has pain at night.\par \par 6/9/21: Here for follow up. She is in PT with relief. Her pain has improved.\par \par 5/11/21: Pt is s/p 4/26/21 RSA. Doing well for initial post op visit. Pain controlled, in a sling.\par \par 3/24/21: Follow up right shoulder. Surgery was denied. Symptoms persists.\par \par 3/3/21: Follow up R shoulder. She reports increased pain since shoveling snow at work. She saw Dr. Villegas and had TPI. She tried PT. She would like to discuss surgery.\par \par 1/20/21: Here for follow up. Her pain is still improved from the Sa injection. She was finally approved for PT.\par \par 12/23/20: Here for follow up. She got good relief from the SA injection. She has not yet been approved for PT.\par \par 11/4/20: Patient is a 54yo RHD female who presents for evaluation of right shoulder pain. She fell at work in March and has had pain since. She saw a doctor at her chiro's office and got an injection in May which helped for a short period of time. She also had an MRI. She denies any prior injury to the shoulder.\par \par MRI R shoulder 4/2/20: Large retracted tears involving the entirety of the supraspinatus and the majority of the infraspinatus tendons, both retracted to the level of the superior glenoid margin and each retracted greater than 3.5 cm in size. Extensive fluid of its synovial inflammation extending into the subacromial bursa with distention of the bursa. There is a small remnant of the posteroinferior aspect of the infraspinatus remaining intact with tendinosis. Severe atrophy of the supraspinatus and infraspinatus muscles. Subscapularis tendon is also torn and retracted approximately 2.5 cm in size to the level medial to the lesser tuberosity. The long head of the biceps tendon is dislocated into the anterior glenohumeral joint and is probably intact but severely thickened with tendinosis. The subscapularis muscle demonstrates moderate to severe atrophy. Hypertrophic acromioclavicular joint changes accompanied by a laterally downsloping Type II acromial configuration. Glenohumeral joint demonstrates anterior decentering of the humeral head with respect to the glenoid. Blunting of the free edge of the anterior labrum. Blunting of the free edge of the anterior and superior labrum. Glenohumeral effusion distends the glenohumeral joint with prominent synovial thickening and synovitis at the axillary recess with synovitis and distention of the synovial fluid into the subscapularis recess with fluid distending the subcoracoid bursa, with subcoracoid bursal distention up to 4.5 cm. Subcortical reactive change and erosion at the convexity of the humeral head.

## 2022-09-14 NOTE — ASSESSMENT
[FreeTextEntry1] : R RCTs.\par Now s/p R RSA.\par She completed and improved with PT.\par HEP for stretching.\par Diclofenac prn.\par She is retired. \par Will get xrays yearly in April.

## 2022-09-14 NOTE — WORK
[Has the patient reached Maximum Medical Improvement? If yes, indicate date___] : Yes, on [unfilled] [Right] : right [Not working] : Not working [FreeTextEntry7] : shoulder [de-identified] : table 5.5 shoulder joint replacement. [FreeTextEntry5] : 35% [de-identified] : FE: 135, 135, 130\par ABD: 130, 130, 125 table 4.5a Good (A) result [If not working, could reasonable accommodations be made to enable patient to perform work? Explain.] : No [Has the patient had an injury/illness since the date of injury which impacts residual functional capacity?] : No [Would the patient benefit from vocational rehabilitation? If Yes, explain below.] :  No [de-identified] : She is retired

## 2022-09-14 NOTE — REASON FOR VISIT
[FreeTextEntry2] : Patient is here for a Worker's Comp Follow Up for the Right Shoulder. DOI: 3/16/2020

## 2022-09-21 ENCOUNTER — APPOINTMENT (OUTPATIENT)
Dept: ORTHOPEDIC SURGERY | Facility: CLINIC | Age: 57
End: 2022-09-21

## 2022-09-21 VITALS — HEIGHT: 62 IN | BODY MASS INDEX: 34.04 KG/M2 | WEIGHT: 185 LBS

## 2022-09-21 DIAGNOSIS — M79.671 PAIN IN RIGHT FOOT: ICD-10-CM

## 2022-09-21 PROCEDURE — 99204 OFFICE O/P NEW MOD 45 MIN: CPT | Mod: 25

## 2022-09-21 PROCEDURE — J3490M: CUSTOM

## 2022-09-21 PROCEDURE — 73630 X-RAY EXAM OF FOOT: CPT | Mod: RT

## 2022-09-21 PROCEDURE — 20600 DRAIN/INJ JOINT/BURSA W/O US: CPT | Mod: RT

## 2022-09-21 PROCEDURE — 73610 X-RAY EXAM OF ANKLE: CPT | Mod: RT

## 2022-09-21 RX ORDER — MELOXICAM 15 MG/1
15 TABLET ORAL DAILY
Qty: 20 | Refills: 0 | Status: ACTIVE | COMMUNITY
Start: 2022-09-21 | End: 1900-01-01

## 2022-09-21 NOTE — PROCEDURE
[Right] : of the right [de-identified] : Right 2nd MTP joint injection was performed. The indication for this injection was pain and inflammation. The site was prepped with alcohol, betadine, and ethyl chloride was sprayed topically and sterile technique was used. An injection of Bupivicaine (Marcaine) 1cc of 0.25%, and Triamcinolone (Kenalog) 1cc of 40mg was used. The patient tolerated the injection well without any complications. Patient was advised to call if redness, pain or fever occur, apply ice for 15 minutes out of every hours for the first 12-24 hours as tolerated and patient advised to rest the joint for 2-3 days.

## 2022-09-21 NOTE — IMAGING
[de-identified] : General: Well-nourished, well developed, in no apparent distress\par Psych: Clear speech, pleasant mood and affect\par Neurological: Alert and oriented to person, place, and time\par Gait: Normal\par Respiratory: Normal respiratory effort\par Skin: No rashes, no lesions, no open skin, no ecchymosis, no erythema\par \par \par Inspection: No swelling, ecchymosis or redness noted.\par \par Alignment: Hindfoot alignment in anatomic valgus, neutral midfoot alignment.\par  \par Palpation: No anterior medial, central or lateral ankle joint line tenderness. No posterior medial or lateral ankle pain. No pain over proximal, midshaft or distal tibia or fibula. Leg Compartments are soft and nontender. Calf is soft and non-tender with a down-going Lloyd test. No pain over the lateral and medial malleolus. No pain over ATFL and CFL. Non-tender over the deltoid ligament or proximal and distal syndesmosis. Negative squeeze test of the syndesmosis. Non-tender over the fibula head or neck. Non-tender over the sinus tarsi.\par No pain over the course of the achilles, peroneal, posterior tibial, anterior tibial or the extensor tendons. \par On examination of the foot: Foot compartments are soft and nontender. No pain over the heel or plantar fascia. No tenderness over the transverse tarsal joints, TMT or the Lisfranc joints on palpation and stress examination. No tenderness over the navicular, cuboid, cuneiforms, or metatarsals shafts. No pain beneath the metatarsal heads.  He is tender over the second MTP joint with pain at extreme flexion and extension of the second MTP joint.  The second MTP joint is stable.  No pain over the hallux, third, fourth or fifth MTP joints.  No pain over the sesamoids.  Full range of motion through the MTP joints. The toes are reduced and well aligned. No pain on examination of the toes, and no webspace tenderness noted\par \par ROM: 15-20 degrees of dorsiflexion, 40 degrees of plantar flexion, 20 degrees of inversion and 20 degrees of eversion without pain. Able to flex and extend all toes without pain \par \par Muscle Strength: 5/5 strength with resisted dorsiflexion, plantar flexion, inversion and eversion without any pain.\par \par Stability: No instability or laxity noted with varus/valgus stress. Negative anterior and posterior drawer test. No pain with dorsiflexion external rotation stress test.\par \par Neurologic Exam : Sensation is grossly intact bilateral upper and lower extremities to light touch throughout. Sensation intact to the sural, posterior tibial, superficial peroneal nerve. 2+ patellar tendon and Achilles tendon reflexes are noted. There is a downgoing Babinski test. No clonus is noted bilaterally.\par \par Vascular: Arterial Pulses right and Left: posterior tibialis normal and dorsalis pedis normal. Right and Left: no edema, no varicosities and brisk capillary refill test normal.\par \par Radiographs: X-rays done today in the office 3 views of the ankle and foot without any limitations which reveal: No acute fractures or dislocations seen. The ankle mortise and syndesmosis are reduced and well aligned. There is no widening of the syndesmosis. No evidence of an osteochondral defect. No fractures of the lateral process of the talus or of the anterior process of the calcaneus noted. The midfoot and forefoot joints are reduced and well aligned.  Relatively slightly long second metatarsal is noted.  A shortened second toe is noted.  Arthrosis of the tibial sesamoid is noted.

## 2022-09-21 NOTE — HISTORY OF PRESENT ILLNESS
[Gradual] : gradual [9] : 9 [0] : 0 [Radiating] : radiating [Tingling] : tingling [Intermittent] : intermittent [Leisure] : leisure [Rest] : rest [Meds] : meds [Walking] : walking [de-identified] : Ms. BROWN is a 57 year female who presents today for evaluation of their right foot pain and swelling.  She states that over the past 3 to 4 weeks she has been having pain at the base of her second toe without any history of trauma or injury to the foot or toes.  She does not notice any redness or warmth of her toes she does not notice any deformity of her toes.  She has always noticed a slightly shortened second toe.  She does feel that she is limited in her shoewear and physical activity as a result of her foot pain.  She does not recall any increase in her physical activity or change in her shoewear [] : no [FreeTextEntry1] : right foot [FreeTextEntry3] : 1 month [FreeTextEntry5] : New patient is here for right foot pain. NKI. Patient has been feeling pain in her right foot for about 1 month. Pt reports feeling numbness sometimes in her toes. Patient has swelling in her foot that comes and goes. [FreeTextEntry7] : travels up side of her leg

## 2022-09-27 ENCOUNTER — APPOINTMENT (OUTPATIENT)
Dept: ORTHOPEDIC SURGERY | Facility: CLINIC | Age: 57
End: 2022-09-27

## 2022-10-13 ENCOUNTER — APPOINTMENT (OUTPATIENT)
Dept: PAIN MANAGEMENT | Facility: CLINIC | Age: 57
End: 2022-10-13

## 2022-10-13 VITALS — HEIGHT: 62 IN | BODY MASS INDEX: 34.04 KG/M2 | WEIGHT: 185 LBS

## 2022-10-13 PROCEDURE — 99214 OFFICE O/P EST MOD 30 MIN: CPT

## 2022-10-13 PROCEDURE — 99072 ADDL SUPL MATRL&STAF TM PHE: CPT

## 2022-10-13 NOTE — PHYSICAL EXAM
[de-identified] : PHYSICAL EXAM\par \par Constitutional: \par Appears well, no apparent distress\par Ability to communicate: Normal\par Respiratory: non-labored breathing\par Skin: no rash noted\par Head: normocephalic, atraumatic\par Neck: no visible thyroid enlargement\par Eyes: extraocular movements intact\par Neurologic: alert and oriented x3\par Psychiatric: normal mood, affect, and behavior\par \par Lumbar Spine: \par Palpation: right lumbar paraspinal spasm and right lumbar paraspinal tenderness to palpation.\par ROM: Diminished range of motion in all plains.  Patient notes pain with lateral bending to the right.\par MMT: Motor exam is 5/5 through out bilateral lower extremities.\par Sensation: Light touch and pain is intact throughout bilateral lower extremities.\par Reflexes: achilles and patella reflexes are intact and  symmetrical.  No sustained clonus.\par Special Testing: Positive straight leg raise on the right side.\par \par

## 2022-10-13 NOTE — DISCUSSION/SUMMARY
[de-identified] : sustained relief with caudal ricki\par continue PT ls spine\par start gabapentin

## 2022-10-13 NOTE — HISTORY OF PRESENT ILLNESS
[Lower back] : lower back [Work related] : work related [5] : 5 [Radiating] : radiating [Sharp] : sharp [Shooting] : shooting [Intermittent] : intermittent [Meds] : meds [Standing] : standing [Walking] : walking [de-identified] : pt is following up for lower back pain , the pain goes into the right leg [] : Patient is currently injured and not playing sports: no [FreeTextEntry3] : 02/12/2021 [FreeTextEntry7] : right leg

## 2022-11-02 ENCOUNTER — APPOINTMENT (OUTPATIENT)
Dept: ORTHOPEDIC SURGERY | Facility: CLINIC | Age: 57
End: 2022-11-02

## 2022-11-02 VITALS — HEIGHT: 62 IN | BODY MASS INDEX: 34.04 KG/M2 | WEIGHT: 185 LBS

## 2022-11-02 DIAGNOSIS — M65.9 SYNOVITIS AND TENOSYNOVITIS, UNSPECIFIED: ICD-10-CM

## 2022-11-02 PROCEDURE — 99214 OFFICE O/P EST MOD 30 MIN: CPT

## 2022-11-02 NOTE — IMAGING
[de-identified] : General: Well-nourished,  in no apparent distress\par Psych: Clear speech, pleasant mood and affect\par Neurological: Alert and oriented to person, place, and time\par Gait: Normal\par Respiratory: Normal respiratory effort\par Skin: No rashes, no lesions, no open skin, no ecchymosis, no erythema\par \par On examination of the ankle and foot there is no swelling or ecchymosis or erythema noted. Hindfoot alignment is in slight valgus. There is no pain over the proximal mid shaft or distal tibia or fibula. Negative syndesmotic squeeze test. There is no pain over the ankle joint, subtalar joint, transverse tarsal joints, or TMT joints. No pain over the medial or lateral malleolus. No pain over the proximal or mid shaft tibia or fibula. The leg compartments including the calf are soft and non-tender with a down-going Lloyd test. There is no pain over the ATFL or CFL laterally or deltoid ligament medially. \par There is no pain  over the course of the Achilles, peroneal or posterior tibial tendons. No ankle or hindfoot instability is noted. \par No pain over the calcaneus, talar neck or talar head. No pain over the navicular, cuboid, cuneiforms, metatarsal shafts or on examination of the toes. No web-space tenderness.  There is a shortened second toe noted.  She is tender over the second MTP joint which is stable.  No pain over the metatarsal heads or hallux, third, fourth or fifth MTP joints. There is full flexion and extension of all the toes. The hallux and lesser toes are reduced and well aligned. \par Sensation is grossly intact throughout to light touch, there is 2+ Achilles tendon reflexes. There is 2+ DP/PT pulses, with brisk capillary refill in all of the toes.\par There is good range of motion of the ankle and hindfoot with 5/5 strength throughout all muscle groups.\par \par

## 2022-11-02 NOTE — ASSESSMENT
[FreeTextEntry1] : After her examination today in the office and review of her radiographs done previously I do think her pain in the foot is secondary to second MTP synovitis as well as she does have some neuropathy or radiculopathy that is causing her numbness and tingling in her ankle and foot.  I would recommend a neurology consultation with an EMG and nerve conduction test in order to further evaluate her ankle and foot numbness and tingling as I do think this most likely is from her lower back pathology which she is being treated and managed by a back specialist.  In addition I did also recommend a toe  splint in order to better support and stabilize the second MTP joint and I did recommend using Voltaren cream which I did give her a written recommendation for that she can purchase at the pharmacy and rub into the dorsal aspect of the MTP joint of the second toe twice a day.  We discussed the importance of stiffer soled shoes and to continue with low impact activities on a daily basis and to refrain from activities that cause her to get up on her toes repeatedly.  I would like to see her back in 3 to 4 weeks for repeat clinical and x-ray examination

## 2022-11-02 NOTE — HISTORY OF PRESENT ILLNESS
[8] : 8 [0] : 0 [Tingling] : tingling [de-identified] : Is here for follow-up of her right foot pain.  She states that the cortisone injection helped her for a short period of time.  She feels that the pain has started to return.  The pain is localized to the base of the second toe she does also complain of symptoms of numbness and tingling in her feet that she has had for the past few months to years she also does have back pathology which is being managed by a back specialist. [] : no [FreeTextEntry1] : right foot  [FreeTextEntry5] : PACO BROWN  is a 57 year female who is here today to follow up on right foot pain, she states the pain has improved a little since last visit. She states she feel a lot of itchiness in the foot and tingling feelings.

## 2022-12-08 ENCOUNTER — APPOINTMENT (OUTPATIENT)
Dept: PAIN MANAGEMENT | Facility: CLINIC | Age: 57
End: 2022-12-08

## 2022-12-08 VITALS — HEIGHT: 62 IN | BODY MASS INDEX: 34.04 KG/M2 | WEIGHT: 185 LBS

## 2022-12-08 PROCEDURE — J3490M: CUSTOM

## 2022-12-08 PROCEDURE — 99072 ADDL SUPL MATRL&STAF TM PHE: CPT

## 2022-12-08 PROCEDURE — 20552 NJX 1/MLT TRIGGER POINT 1/2: CPT

## 2022-12-08 PROCEDURE — 99214 OFFICE O/P EST MOD 30 MIN: CPT | Mod: 25

## 2022-12-08 RX ORDER — DICLOFENAC SODIUM 50 MG/1
50 TABLET, DELAYED RELEASE ORAL
Qty: 60 | Refills: 1 | Status: ACTIVE | COMMUNITY
Start: 2022-12-08 | End: 1900-01-01

## 2022-12-08 NOTE — HISTORY OF PRESENT ILLNESS
[Lower back] : lower back [Work related] : work related [10] : 10 [Burning] : burning [Radiating] : radiating [Sharp] : sharp [Shooting] : shooting [Stabbing] : stabbing [Tingling] : tingling [Constant] : constant [Sleep] : sleep [Injection therapy] : injection therapy [Sitting] : sitting [Standing] : standing [Walking] : walking [Bending forward] : bending forward [Stairs] : stairs [Lying in bed] : lying in bed [de-identified] : pt is following up for lower back pain , the pain goes into the left side she feels it going all the way down \par Had some caudal [] : Patient is currently injured and not playing sports: no [FreeTextEntry3] : 02/12/2021 [FreeTextEntry6] : numbness

## 2022-12-14 ENCOUNTER — APPOINTMENT (OUTPATIENT)
Dept: ORTHOPEDIC SURGERY | Facility: CLINIC | Age: 57
End: 2022-12-14

## 2023-01-19 ENCOUNTER — APPOINTMENT (OUTPATIENT)
Dept: PAIN MANAGEMENT | Facility: CLINIC | Age: 58
End: 2023-01-19
Payer: OTHER MISCELLANEOUS

## 2023-01-19 PROCEDURE — 99075 MEDICAL TESTIMONY: CPT

## 2023-01-19 PROCEDURE — 99072 ADDL SUPL MATRL&STAF TM PHE: CPT

## 2023-01-24 ENCOUNTER — FORM ENCOUNTER (OUTPATIENT)
Age: 58
End: 2023-01-24

## 2023-03-02 ENCOUNTER — APPOINTMENT (OUTPATIENT)
Dept: PAIN MANAGEMENT | Facility: CLINIC | Age: 58
End: 2023-03-02
Payer: OTHER MISCELLANEOUS

## 2023-03-02 VITALS — BODY MASS INDEX: 34.04 KG/M2 | HEIGHT: 62 IN | WEIGHT: 185 LBS

## 2023-03-02 PROCEDURE — 99072 ADDL SUPL MATRL&STAF TM PHE: CPT

## 2023-03-02 PROCEDURE — 99455 WORK RELATED DISABILITY EXAM: CPT

## 2023-03-02 NOTE — PHYSICAL EXAM
[Flexion] : flexion [Extension] : extension [5___] : right hamstring 5[unfilled]/5 [] : positive Faulkner maneuver facet loading [Right lower extremity below knee] : right lower extremity below knee

## 2023-03-07 NOTE — HISTORY OF PRESENT ILLNESS
[Lower back] : lower back [Work related] : work related [9] : 9 [Burning] : burning [Radiating] : radiating [Sharp] : sharp [Shooting] : shooting [Stabbing] : stabbing [Constant] : constant [Sleep] : sleep [Meds] : meds [Sitting] : sitting [Standing] : standing [Walking] : walking [Bending forward] : bending forward [Stairs] : stairs [Lying in bed] : lying in bed [FreeTextEntry1] : patient is present to discuss with doctor and complete a SLU \par Patient still has pain but better with last caudal.  Will hold off on repeat.  \par Has reached MMI [] : Patient is currently injured and not playing sports: no [FreeTextEntry3] : 02/12/2021 [FreeTextEntry7] : into the legs

## 2023-03-07 NOTE — WORK
[Light Work:] : Light Work: Exerting up to 20 pounds of force occasionally, and/or up to 10 pounds of force frequently and/or negligible amount of force constantly to move objects. Physical demand requirements are in excess of those for Sedentary Work. Even though the weight lifted may only be a negligible amount, a job should be rated Light Work: (1) when it requires walking or standing to a significant degree: or (2) when it requires sitting most of the time but entails pushing and/or pulling of arm or leg controls: and/or (3) when the job requires working at a production rate pace entailing the constant pushing and/or pulling of materials even though the weight of those materials is negligible. NOTE: The constant stress of maintaining a production rate pace, especially in an industrial setting, can be and is physically demanding of a worker even though the amount of force exerted is negligible. [Has the patient reached Maximum Medical Improvement? If yes, indicate date___] : Yes, on [unfilled] [Is there permanent partial impairment?] : Yes [___lbs] : Occasionally: [unfilled] lbs [] : Occasionally [Light Work] : Light work [Could this patient perform his/her at-injury work activities with restrictions? If yes, specify below.] : Yes [Are consistent with the injury] : are consistent with the injury [Partial] : partial [Does not reveal pre-existing condition(s) that may affect treatment/prognosis] : does not reveal pre-existing condition(s) that may affect treatment/prognosis [Patient] : patient [No] : No [FreeTextEntry1] : Should be allowed injection therapy [FreeTextEntry7] : lumbar spine [Could this patient perform any work activities with or without restrictions? Explain below.] : No [de-identified] : lumbar spine [de-identified] : S11.4a 5, s11.4 4, S11.6 16 [de-identified] : E [de-identified] : MRI correlates to pain  [de-identified] : F [de-identified] : Frequent standing

## 2023-04-07 ENCOUNTER — RX RENEWAL (OUTPATIENT)
Age: 58
End: 2023-04-07

## 2023-04-07 RX ORDER — GABAPENTIN 300 MG/1
300 CAPSULE ORAL
Qty: 90 | Refills: 0 | Status: ACTIVE | COMMUNITY
Start: 2022-07-20 | End: 1900-01-01

## 2023-12-06 ENCOUNTER — APPOINTMENT (OUTPATIENT)
Dept: PAIN MANAGEMENT | Facility: CLINIC | Age: 58
End: 2023-12-06
Payer: OTHER MISCELLANEOUS

## 2023-12-06 VITALS — HEIGHT: 62 IN | BODY MASS INDEX: 34.04 KG/M2 | WEIGHT: 185 LBS

## 2023-12-06 DIAGNOSIS — M54.50 LOW BACK PAIN, UNSPECIFIED: ICD-10-CM

## 2023-12-06 DIAGNOSIS — M54.17 RADICULOPATHY, LUMBOSACRAL REGION: ICD-10-CM

## 2023-12-06 PROCEDURE — 99214 OFFICE O/P EST MOD 30 MIN: CPT

## 2025-06-13 NOTE — PATIENT PROFILE ADULT - NSPROHMSYMPCOND_GEN_A_NUR
General Instructions:  You are considered stable for discharge from the emergency department. Please carefully follow all the instructions you were given verbally as well as the written instructions given below. In general, immediately return to the emergency department if the symptoms you presented with today increase in severity, change in any way, and/or do not improve in what you consider an acceptable time frame.      Medication(s) Instructions (if applicable):  If you were given any medication(s) upon discharge, please strictly follow the directions as prescribed for taking the medication(s). Should you feel you develop any type of reaction to the medication(s), including, but not limited to, rash, swelling of the lips or face, or difficulty breathing, immediately discontinue the use of the medication(s) and seek prompt medical care. Please read the medication(s) insert provided by the pharmacy and follow all guidelines and recommendations. Please take all medications as prescribed for your symptoms or diagnoses.                Follow-Up Instructions:  If you were given instructions to follow-up with a health care provider upon discharge, please be sure to do so.  It is your responsibility to call and/or make an appointment with the health care providers listed on your discharge papers and/or your primary care provider in the appropriate time frame given.  Please take a copy of your discharge papers with you to your follow-up appointment(s). Please follow up with your primary care physician for your symptoms. Please follow up with any specialist provider in clinic that your were given instructions to see as an outpatient. Discuss any imaging/laboratory studies with your primary doctor for review of any additional findings that my require non-emergent follow up.    Special Information / Instructions:  Please read and follow all attached discharge instructions.      Please call the Emergency Department/Urgent  care at which you were seen with any questions or concerns:    [facility] Emergency Department at [phone]   [facility] Emergency Department at [phone]  [facility] Emergency Department at [phone]  [facility] [phone]   [facility] at [phone]    Please return to the Emergency Department if you have persistence or worsening of your symptoms.     none